# Patient Record
Sex: MALE | Race: BLACK OR AFRICAN AMERICAN | NOT HISPANIC OR LATINO | Employment: FULL TIME | URBAN - METROPOLITAN AREA
[De-identification: names, ages, dates, MRNs, and addresses within clinical notes are randomized per-mention and may not be internally consistent; named-entity substitution may affect disease eponyms.]

---

## 2017-02-22 ENCOUNTER — OFFICE VISIT (OUTPATIENT)
Dept: URGENT CARE | Facility: URGENT CARE | Age: 52
End: 2017-02-22
Payer: COMMERCIAL

## 2017-02-22 ENCOUNTER — RADIANT APPOINTMENT (OUTPATIENT)
Dept: GENERAL RADIOLOGY | Facility: CLINIC | Age: 52
End: 2017-02-22
Attending: PHYSICIAN ASSISTANT
Payer: COMMERCIAL

## 2017-02-22 VITALS
TEMPERATURE: 99 F | OXYGEN SATURATION: 97 % | BODY MASS INDEX: 28.67 KG/M2 | WEIGHT: 235.56 LBS | SYSTOLIC BLOOD PRESSURE: 128 MMHG | HEART RATE: 78 BPM | DIASTOLIC BLOOD PRESSURE: 69 MMHG

## 2017-02-22 DIAGNOSIS — R50.9 FEVER AND CHILLS: ICD-10-CM

## 2017-02-22 DIAGNOSIS — J18.9 PNEUMONIA OF LEFT LOWER LOBE DUE TO INFECTIOUS ORGANISM: Primary | ICD-10-CM

## 2017-02-22 LAB
FLUAV+FLUBV AG SPEC QL: NEGATIVE
FLUAV+FLUBV AG SPEC QL: NORMAL
SPECIMEN SOURCE: NORMAL

## 2017-02-22 PROCEDURE — 99214 OFFICE O/P EST MOD 30 MIN: CPT | Performed by: PHYSICIAN ASSISTANT

## 2017-02-22 PROCEDURE — 87804 INFLUENZA ASSAY W/OPTIC: CPT | Performed by: PHYSICIAN ASSISTANT

## 2017-02-22 PROCEDURE — 71020 XR CHEST 2 VW: CPT

## 2017-02-22 RX ORDER — CEFTRIAXONE 1 G/1
1000 INJECTION, POWDER, FOR SOLUTION INTRAMUSCULAR; INTRAVENOUS ONCE
Qty: 10 ML | Refills: 0 | OUTPATIENT
Start: 2017-02-22 | End: 2017-02-22

## 2017-02-22 RX ORDER — AZITHROMYCIN 250 MG/1
TABLET, FILM COATED ORAL
Qty: 6 TABLET | Refills: 0 | Status: SHIPPED | OUTPATIENT
Start: 2017-02-22 | End: 2017-07-12

## 2017-02-22 RX ORDER — CODEINE PHOSPHATE AND GUAIFENESIN 10; 100 MG/5ML; MG/5ML
1-2 SOLUTION ORAL EVERY 4 HOURS PRN
Qty: 240 ML | Refills: 0 | Status: SHIPPED | OUTPATIENT
Start: 2017-02-22 | End: 2017-07-12

## 2017-02-22 NOTE — PROGRESS NOTES
SUBJECTIVE:   Milad Brown is a 51 year old male presenting with a chief complaint of   1) fevers up to 105 over the past 2-3 days.  Last Advil was about 3 hours ago  2) productive cough for the past 2 days  3) sinus congestion for the past week  Onset of symptoms was as above.  Course of illness is worsening.    Severity moderate  Current and Associated symptoms: as above  Treatment measures tried include OTC meds.  Predisposing factors include h/o pneumonia a few years back.     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  Patient Active Problem List   Diagnosis     Herpes zoster     CARDIOVASCULAR SCREENING; LDL GOAL LESS THAN 160     Tinnitus, bilateral     Social History   Substance Use Topics     Smoking status: Never Smoker     Smokeless tobacco: Never Used     Alcohol use No       ROS:  CONSTITUTIONAL:as per HPI  INTEGUMENTARY/SKIN: NEGATIVE for worrisome rashes, moles or lesions  EYES: NEGATIVE for vision changes or irritation  ENT/MOUTH: as per HPI  RESP:as per HPI  CV: NEGATIVE for chest pain, palpitations or peripheral edema  GI: NEGATIVE for nausea, abdominal pain, heartburn, or change in bowel habits  MUSCULOSKELETAL: NEGATIVE for significant arthralgias or myalgia    OBJECTIVE  :/69 (BP Location: Left arm, Patient Position: Chair, Cuff Size: Adult Large)  Pulse 78  Temp 99  F (37.2  C) (Oral)  Wt 235 lb 9 oz (106.9 kg)  SpO2 97%  BMI 28.67 kg/m2  GENERAL APPEARANCE: healthy, alert and no distress  EYES: EOMI,  PERRL, conjunctiva clear  HENT: ear canals and TM's normal.  Nose and mouth without ulcers, erythema or lesions  NECK: supple, nontender, no lymphadenopathy  RESP: lungs clear to auscultation - no rales, rhonchi or wheezes  CV: regular rates and rhythm, normal S1 S2, no murmur noted  ABDOMEN:  soft, nontender, no HSM or masses and bowel sounds normal  NEURO: Normal strength and tone, sensory exam grossly normal,   normal speech and mentation  SKIN: no suspicious lesions or rashes    CXR: left lower lobe infiltrate     (J18.9) Pneumonia of left lower lobe due to infectious organism  (primary encounter diagnosis)  Comment:   Plan: cefTRIAXone (ROCEPHIN) 1 GM vial, azithromycin         (ZITHROMAX Z-CLOVIS) 250 MG tablet,         guaiFENesin-codeine (ROBITUSSIN AC) 100-10         MG/5ML SOLN solution            (R50.9) Fever and chills  Comment:   Plan: Influenza A/B antigen, XR Chest 2 Views        Tylenol or Ibuprofen as needed for pain or fevers.      Follow up with Primary Physician for re-check in one week, sooner should symptoms persist.  TO ED should symptoms worsen in ANY way.    Patient expresses understanding and agreement with the assessment and plan as above.

## 2017-02-22 NOTE — MR AVS SNAPSHOT
After Visit Summary   2/22/2017    Milad Brown    MRN: [id number]           Patient Information     Date Of Birth          1965        Visit Information        Provider Department      2/22/2017 2:15 PM Kathe Villar PA-C [facility]        Today's Diagnoses     Pneumonia of left lower lobe due to infectious organism    -  1    Fever and chills          Care Instructions    (J18.9) Pneumonia of left lower lobe due to infectious organism  (primary encounter diagnosis)  Comment:   Plan: cefTRIAXone (ROCEPHIN) 1 GM vial, azithromycin         (ZITHROMAX Z-CLOVIS) 250 MG tablet,         guaiFENesin-codeine (ROBITUSSIN AC) 100-10         MG/5ML SOLN solution            (R50.9) Fever and chills  Comment:   Plan: Influenza A/B antigen, XR Chest 2 Views        Tylenol or Ibuprofen as needed for pain or fevers.      Follow up with Primary Physician for re-check in one week, sooner should symptoms persist.  TO ED should symptoms worsen in ANY way.              Follow-ups after your visit        Who to contact     If you have questions or need follow up information about today's clinic visit or your schedule please contact [facility] directly at [phone].  Normal or non-critical lab and imaging results will be communicated to you by BrandConthart, letter or phone within 4 business days after the clinic has received the results. If you do not hear from us within 7 days, please contact the clinic through BrandConthart or phone. If you have a critical or abnormal lab result, we will notify you by phone as soon as possible.  Submit refill requests through Helishopter or call your pharmacy and they will forward the refill request to us. Please allow 3 business days for your refill to be completed.          Additional Information About Your Visit        BrandConthart Information     Helishopter lets you send messages to your doctor, view your test results, renew  "your prescriptions, schedule appointments and more. To sign up, go to [email or web address] . Click on \"Log in\" on the left side of the screen, which will take you to the Welcome page. Then click on \"Sign up Now\" on the right side of the page.     You will be asked to enter the access code listed below, as well as some personal information. Please follow the directions to create your username and password.     Your access code is: [email or web address]  Expires: 2017  4:07 PM     Your access code will  in 90 days. If you need help or a new code, please call your [place] clinic or [phone].        Care EveryWhere ID     This is your Care EveryWhere ID. This could be used by other organizations to access your [place] medical records  [id number]        Your Vitals Were     Pulse Temperature Pulse Oximetry BMI (Body Mass Index)          78 99  F (37.2  C) (Oral) 97% 28.67 kg/m2         Blood Pressure from Last 3 Encounters:   17 128/69   10/14/16 126/80   08/10/16 126/74    Weight from Last 3 Encounters:   17 235 lb 9 oz (106.9 kg)   10/14/16 239 lb (108.4 kg)   08/10/16 241 lb (109.3 kg)              We Performed the Following     Influenza A/B antigen          Today's Medication Changes          These changes are accurate as of: 17  4:07 PM.  If you have any questions, ask your nurse or doctor.               Start taking these medicines.        Dose/Directions    azithromycin 250 MG tablet   Commonly known as:  ZITHROMAX Z-CLOVIS   Used for:  Pneumonia of left lower lobe due to infectious organism   Started by:  Kathe Villar PA-C        two tablets first day and 1 tablet daily for 4 days   Quantity:  6 tablet   Refills:  0       cefTRIAXone 1 GM vial   Commonly known as:  ROCEPHIN   Used for:  Pneumonia of left lower lobe due to infectious organism   Started by:  Kathe Villar PA-C        Dose:  1000 mg   Inject 1 g (1,000 mg) into the muscle once for 1 dose "   Quantity:  10 mL   Refills:  0       guaiFENesin-codeine 100-10 MG/5ML Soln solution   Commonly known as:  ROBITUSSIN AC   Used for:  Pneumonia of left lower lobe due to infectious organism   Started by:  Kathe Villar PA-C        Dose:  1-2 tsp.   Take 5-10 mLs by mouth every 4 hours as needed for cough   Quantity:  240 mL   Refills:  0            Where to get your medicines      These medications were sent to [facility] [address] [place] [address] [id number] [place] [address]  [id number] [place] [address]    Hours:  24-hours Phone:  [phone]     azithromycin 250 MG tablet         Some of these will need a paper prescription and others can be bought over the counter.  Ask your nurse if you have questions.     Bring a paper prescription for each of these medications     guaiFENesin-codeine 100-10 MG/5ML Soln solution       You don't need a prescription for these medications     cefTRIAXone 1 GM vial                Primary Care Provider Office Phone # Fax #    Saeid Mcleod [phone]       [facility] [address] [place] [address]        Thank you!     Thank you for choosing [facility]  for your care. Our goal is always to provide you with excellent care. Hearing back from our patients is one way we can continue to improve our services. Please take a few minutes to complete the written survey that you may receive in the mail after your visit with us. Thank you!             Your Updated Medication List - Protect others around you: Learn how to safely use, store and throw away your medicines at www.disposemymeds.org.          This list is accurate as of: 2/22/17  4:07 PM.  Always use your most recent med list.                   Brand Name Dispense Instructions for use    azithromycin 250 MG tablet    ZITHROMAX Z-CLOVIS    6 tablet    two tablets first day and 1 tablet daily for 4  days       cefTRIAXone 1 GM vial    ROCEPHIN    10 mL    Inject 1 g (1,000 mg) into the muscle once for 1 dose       guaiFENesin-codeine 100-10 MG/5ML Soln solution    ROBITUSSIN AC    240 mL    Take 5-10 mLs by mouth every 4 hours as needed for cough       ibuprofen 800 MG tablet    ADVIL/MOTRIN    30 tablet    Take 1 tablet (800 mg) by mouth every 8 hours as needed for moderate pain

## 2017-02-22 NOTE — NURSING NOTE
"Chief Complaint   Patient presents with     Cough     cough, chest pain when coughing, wheezing, sob, bodyaches, fatigue and fever for one week.        Initial /69 (BP Location: Left arm, Patient Position: Chair, Cuff Size: Adult Large)  Pulse 78  Temp 99  F (37.2  C) (Oral)  Wt 235 lb 9 oz (106.9 kg)  SpO2 97%  BMI 28.67 kg/m2 Estimated body mass index is 28.67 kg/(m^2) as calculated from the following:    Height as of 10/14/16: 6' 4\" (1.93 m).    Weight as of this encounter: 235 lb 9 oz (106.9 kg).  Medication Reconciliation: complete    "

## 2017-02-22 NOTE — PATIENT INSTRUCTIONS
(J18.9) Pneumonia of left lower lobe due to infectious organism  (primary encounter diagnosis)  Comment:   Plan: cefTRIAXone (ROCEPHIN) 1 GM vial, azithromycin         (ZITHROMAX Z-CLOVIS) 250 MG tablet,         guaiFENesin-codeine (ROBITUSSIN AC) 100-10         MG/5ML SOLN solution            (R50.9) Fever and chills  Comment:   Plan: Influenza A/B antigen, XR Chest 2 Views        Tylenol or Ibuprofen as needed for pain or fevers.      Follow up with Primary Physician for re-check in one week, sooner should symptoms persist.  TO ED should symptoms worsen in ANY way.

## 2017-02-22 NOTE — LETTER
[facility]  [address] [address]  [phone]      February 22, 2017    RE:  Milad Brown                                                                                                                                                       [address] [facility] [address]            To whom it may concern:    Milad Brown was seen in clinic today and was diagnosed with Pneumonia.  He may return to work when he has been fever free for 24 hours.          Sincerely,        Kathe Brennan PAC    [facility]

## 2017-04-12 ENCOUNTER — OFFICE VISIT (OUTPATIENT)
Dept: INTERNAL MEDICINE | Facility: CLINIC | Age: 52
End: 2017-04-12
Payer: COMMERCIAL

## 2017-04-12 VITALS
HEIGHT: 76 IN | TEMPERATURE: 98.4 F | DIASTOLIC BLOOD PRESSURE: 80 MMHG | HEART RATE: 80 BPM | SYSTOLIC BLOOD PRESSURE: 110 MMHG | OXYGEN SATURATION: 97 % | BODY MASS INDEX: 28.56 KG/M2 | WEIGHT: 234.5 LBS

## 2017-04-12 DIAGNOSIS — J40 BRONCHITIS: Primary | ICD-10-CM

## 2017-04-12 PROCEDURE — 99213 OFFICE O/P EST LOW 20 MIN: CPT | Performed by: FAMILY MEDICINE

## 2017-04-12 RX ORDER — CEFUROXIME AXETIL 500 MG/1
500 TABLET ORAL 2 TIMES DAILY
Qty: 20 TABLET | Refills: 0 | Status: SHIPPED | OUTPATIENT
Start: 2017-04-12 | End: 2017-07-12

## 2017-04-12 RX ORDER — PREDNISONE 20 MG/1
TABLET ORAL
Qty: 12 TABLET | Refills: 0 | Status: SHIPPED | OUTPATIENT
Start: 2017-04-12 | End: 2017-07-12

## 2017-04-12 NOTE — MR AVS SNAPSHOT
"              After Visit Summary   4/12/2017    Milad Brown    MRN: [id number]           Patient Information     Date Of Birth          1965        Visit Information        Provider Department      4/12/2017 3:00 PM Anjum Weaver MD [facility]        Today's Diagnoses     Bronchitis    -  1      Care Instructions      Take prescribed medication as directed.          Follow-ups after your visit        Your next 10 appointments already scheduled     May 10, 2017  4:00 PM CDT   PHYSICAL with Kay Garnica MD   [facility]    [address]  [facility] [id number] [phone]              Who to contact     If you have questions or need follow up information about today's clinic visit or your schedule please contact [facility] directly at [phone].  Normal or non-critical lab and imaging results will be communicated to you by MyChart, letter or phone within 4 business days after the clinic has received the results. If you do not hear from us within 7 days, please contact the clinic through MyChart or phone. If you have a critical or abnormal lab result, we will notify you by phone as soon as possible.  Submit refill requests through FarmaciaClub or call your pharmacy and they will forward the refill request to us. Please allow 3 business days for your refill to be completed.          Additional Information About Your Visit        MyChart Information     FarmaciaClub lets you send messages to your doctor, view your test results, renew your prescriptions, schedule appointments and more. To sign up, go to [email or web address] . Click on \"Log in\" on the left side of the screen, which will take you to the Welcome page. Then click on \"Sign up Now\" on the right side of the page.     You will be asked to enter the access code listed below, as well as some personal information. Please follow the directions to create " "your username and password.     Your access code is: [email or web address]  Expires: 2017  5:07 PM     Your access code will  in 90 days. If you need help or a new code, please call your [facility] or [phone].        Care EveryWhere ID     This is your Care EveryWhere ID. This could be used by other organizations to access your [place] medical records  [id number]        Your Vitals Were     Pulse Temperature Height Pulse Oximetry BMI (Body Mass Index)       80 98.4  F (36.9  C) (Oral) 6' 4\" (1.93 m) 97% 28.54 kg/m2        Blood Pressure from Last 3 Encounters:   17 110/80   17 128/69   10/14/16 126/80    Weight from Last 3 Encounters:   17 234 lb 8 oz (106.4 kg)   17 235 lb 9 oz (106.9 kg)   10/14/16 239 lb (108.4 kg)              Today, you had the following     No orders found for display         Today's Medication Changes          These changes are accurate as of: 17  3:28 PM.  If you have any questions, ask your nurse or doctor.               Start taking these medicines.        Dose/Directions    cefuroxime 500 MG tablet   Commonly known as:  CEFTIN   Used for:  Bronchitis   Started by:  Anjum Weaver MD        Dose:  500 mg   Take 1 tablet (500 mg) by mouth 2 times daily   Quantity:  20 tablet   Refills:  0       predniSONE 20 MG tablet   Commonly known as:  DELTASONE   Used for:  Bronchitis   Started by:  Anjum Weaver MD        Take 2 daily for 4 days, then 1 daily for 4 days.   Quantity:  12 tablet   Refills:  0            Where to get your medicines      These medications were sent to [facility] [address] [place] [address]  [address] [place] [address]    Hours:  24-hours Phone:  [phone]     cefuroxime 500 MG tablet    predniSONE 20 MG tablet                Primary Care Provider Office Phone # Fax #    Saeid Mcleod [phone]       XXX RESIGNED  [address] [facility] [address]  [facility] [address]        Thank you!     Thank you for choosing [facility]  for your care. Our goal is always to provide you with excellent care. Hearing back from our patients is one way we can continue to improve our services. Please take a few minutes to complete the written survey that you may receive in the mail after your visit with us. Thank you!             Your Updated Medication List - Protect others around you: Learn how to safely use, store and throw away your medicines at www.disposemymeds.org.          This list is accurate as of: 4/12/17  3:28 PM.  Always use your most recent med list.                   Brand Name Dispense Instructions for use    azithromycin 250 MG tablet    ZITHROMAX Z-CLOVIS    6 tablet    two tablets first day and 1 tablet daily for 4 days       cefuroxime 500 MG tablet    CEFTIN    20 tablet    Take 1 tablet (500 mg) by mouth 2 times daily       guaiFENesin-codeine 100-10 MG/5ML Soln solution    ROBITUSSIN AC    240 mL    Take 5-10 mLs by mouth every 4 hours as needed for cough       ibuprofen 800 MG tablet    ADVIL/MOTRIN    30 tablet    Take 1 tablet (800 mg) by mouth every 8 hours as needed for moderate pain       predniSONE 20 MG tablet    DELTASONE    12 tablet    Take 2 daily for 4 days, then 1 daily for 4 days.

## 2017-04-12 NOTE — PROGRESS NOTES
"CHIEF COMPLAINT    Chest congestion      HISTORY    He has persistent chest congestion and cough. Began in Feb with flu like illness. He did take Z Christ.    No asthma    Non smoker    He has been working some long hours + refs sports.    Patient Active Problem List   Diagnosis     Herpes zoster     CARDIOVASCULAR SCREENING; LDL GOAL LESS THAN 160     Tinnitus, bilateral       REVIEW OF SYSTEMS    No ST  No HA  No SOB or wheeze      History reviewed. No pertinent past medical history.      EXAM  /80 (BP Location: Right arm, Patient Position: Chair, Cuff Size: Adult Large)  Pulse 80  Temp 98.4  F (36.9  C) (Oral)  Ht 6' 4\" (1.93 m)  Wt 234 lb 8 oz (106.4 kg)  SpO2 97%  BMI 28.54 kg/m2    Appears well in general  Phar neg  Neck no adenopathy or mass  Chest clear      (J40) Bronchitis  (primary encounter diagnosis)  Comment:   Plan: cefuroxime (CEFTIN) 500 MG tablet, predniSONE         (DELTASONE) 20 MG tablet              "

## 2017-04-12 NOTE — NURSING NOTE
"Chief Complaint   Patient presents with     Cough     He is coughing - spitting up mucus - for about 2 weeks.       Initial /80 (BP Location: Right arm, Patient Position: Chair, Cuff Size: Adult Large)  Pulse 80  Temp 98.4  F (36.9  C) (Oral)  Ht 6' 4\" (1.93 m)  Wt 234 lb 8 oz (106.4 kg)  SpO2 97%  BMI 28.54 kg/m2 Estimated body mass index is 28.54 kg/(m^2) as calculated from the following:    Height as of this encounter: 6' 4\" (1.93 m).    Weight as of this encounter: 234 lb 8 oz (106.4 kg).  Medication Reconciliation: complete   Sharmin Elder MA      "

## 2017-07-12 ENCOUNTER — OFFICE VISIT (OUTPATIENT)
Dept: INTERNAL MEDICINE | Facility: CLINIC | Age: 52
End: 2017-07-12
Payer: COMMERCIAL

## 2017-07-12 VITALS
HEIGHT: 76 IN | HEART RATE: 67 BPM | DIASTOLIC BLOOD PRESSURE: 70 MMHG | SYSTOLIC BLOOD PRESSURE: 118 MMHG | WEIGHT: 238.9 LBS | TEMPERATURE: 98.2 F | OXYGEN SATURATION: 97 % | BODY MASS INDEX: 29.09 KG/M2

## 2017-07-12 DIAGNOSIS — Z00.00 ENCOUNTER FOR ROUTINE ADULT HEALTH EXAMINATION WITHOUT ABNORMAL FINDINGS: Primary | ICD-10-CM

## 2017-07-12 LAB
BASOPHILS # BLD AUTO: 0 10E9/L (ref 0–0.2)
BASOPHILS NFR BLD AUTO: 1 %
DIFFERENTIAL METHOD BLD: ABNORMAL
EOSINOPHIL # BLD AUTO: 0.1 10E9/L (ref 0–0.7)
EOSINOPHIL NFR BLD AUTO: 2 %
ERYTHROCYTE [DISTWIDTH] IN BLOOD BY AUTOMATED COUNT: 13.7 % (ref 10–15)
HCT VFR BLD AUTO: 40.7 % (ref 40–53)
HGB BLD-MCNC: 13.2 G/DL (ref 13.3–17.7)
LYMPHOCYTES # BLD AUTO: 2.4 10E9/L (ref 0.8–5.3)
LYMPHOCYTES NFR BLD AUTO: 52 %
MCH RBC QN AUTO: 27.9 PG (ref 26.5–33)
MCHC RBC AUTO-ENTMCNC: 32.4 G/DL (ref 31.5–36.5)
MCV RBC AUTO: 86 FL (ref 78–100)
MONOCYTES # BLD AUTO: 0.4 10E9/L (ref 0–1.3)
MONOCYTES NFR BLD AUTO: 8 %
NEUTROPHILS # BLD AUTO: 1.7 10E9/L (ref 1.6–8.3)
NEUTROPHILS NFR BLD AUTO: 37 %
PLATELET # BLD AUTO: 172 10E9/L (ref 150–450)
PLATELET # BLD EST: NORMAL 10*3/UL
RBC # BLD AUTO: 4.73 10E12/L (ref 4.4–5.9)
RBC MORPH BLD: ABNORMAL
WBC # BLD AUTO: 4.6 10E9/L (ref 4–11)

## 2017-07-12 PROCEDURE — 99396 PREV VISIT EST AGE 40-64: CPT | Performed by: INTERNAL MEDICINE

## 2017-07-12 PROCEDURE — 80061 LIPID PANEL: CPT | Performed by: INTERNAL MEDICINE

## 2017-07-12 PROCEDURE — 36415 COLL VENOUS BLD VENIPUNCTURE: CPT | Performed by: INTERNAL MEDICINE

## 2017-07-12 PROCEDURE — G0103 PSA SCREENING: HCPCS | Performed by: INTERNAL MEDICINE

## 2017-07-12 PROCEDURE — 80050 GENERAL HEALTH PANEL: CPT | Performed by: INTERNAL MEDICINE

## 2017-07-12 NOTE — MR AVS SNAPSHOT
After Visit Summary   7/12/2017    Milad Brown    MRN: [id number]           Patient Information     Date Of Birth          1965        Visit Information        Provider Department      7/12/2017 3:00 PM Kay Garnica MD [facility]        Today's Diagnoses     Encounter for routine adult health examination without abnormal findings    -  1      Care Instructions      Preventive Health Recommendations  Male Ages 50 - 64    Yearly exam:             See your health care provider every year in order to  o   Review health changes.   o   Discuss preventive care.    o   Review your medicines if your doctor has prescribed any.     Have a cholesterol test every 5 years, or more frequently if you are at risk for high cholesterol/heart disease.     Have a diabetes test (fasting glucose) every three years. If you are at risk for diabetes, you should have this test more often.     Have a colonoscopy at age 50, or have a yearly FIT test (stool test). These exams will check for colon cancer.      Talk with your health care provider about whether or not a prostate cancer screening test (PSA) is right for you.    You should be tested each year for STDs (sexually transmitted diseases), if you re at risk.     Shots: Get a flu shot each year. Get a tetanus shot every 10 years.     Nutrition:    Eat at least 5 servings of fruits and vegetables daily.     Eat whole-grain bread, whole-wheat pasta and brown rice instead of white grains and rice.     Talk to your provider about Calcium and Vitamin D.     Lifestyle    Exercise for at least 150 minutes a week (30 minutes a day, 5 days a week). This will help you control your weight and prevent disease.     Limit alcohol to one drink per day.     No smoking.     Wear sunscreen to prevent skin cancer.     See your dentist every six months for an exam and cleaning.     See your eye doctor every 1 to 2 years.            Follow-ups after your visit    "     Who to contact     If you have questions or need follow up information about today's clinic visit or your schedule please contact [facility] directly at [phone].  Normal or non-critical lab and imaging results will be communicated to you by MyChart, letter or phone within 4 business days after the clinic has received the results. If you do not hear from us within 7 days, please contact the clinic through MyChart or phone. If you have a critical or abnormal lab result, we will notify you by phone as soon as possible.  Submit refill requests through Agentrun or call your pharmacy and they will forward the refill request to us. Please allow 3 business days for your refill to be completed.          Additional Information About Your Visit        Agentrun Information     Agentrun lets you send messages to your doctor, view your test results, renew your prescriptions, schedule appointments and more. To sign up, go to [email or web address] . Click on \"Log in\" on the left side of the screen, which will take you to the Welcome page. Then click on \"Sign up Now\" on the right side of the page.     You will be asked to enter the access code listed below, as well as some personal information. Please follow the directions to create your username and password.     Your access code is: [email or web address]  Expires: 10/10/2017  4:05 PM     Your access code will  in 90 days. If you need help or a new code, please call your [place] clinic or [phone].        Care EveryWhere ID     This is your Care EveryWhere ID. This could be used by other organizations to access your [place] medical records  [id number]        Your Vitals Were     Pulse Temperature Height Pulse Oximetry BMI (Body Mass Index)       67 98.2  F (36.8  C) (Oral) 6' 4\" (1.93 m) 97% 29.08 kg/m2        Blood Pressure from Last 3 Encounters:   17 118/70   17 110/80   17 128/69    Weight from Last 3 Encounters:   17 238 " lb 14.4 oz (108.4 kg)   04/12/17 234 lb 8 oz (106.4 kg)   02/22/17 235 lb 9 oz (106.9 kg)              We Performed the Following     CBC with platelets differential     Comprehensive metabolic panel     Lipid panel reflex to direct LDL     PSA, screen     TSH with free T4 reflex          Today's Medication Changes          These changes are accurate as of: 7/12/17  4:05 PM.  If you have any questions, ask your nurse or doctor.               Stop taking these medicines if you haven't already. Please contact your care team if you have questions.     cefuroxime 500 MG tablet   Commonly known as:  CEFTIN   Stopped by:  Kay Garnica MD           guaiFENesin-codeine 100-10 MG/5ML Soln solution   Commonly known as:  ROBITUSSIN AC   Stopped by:  Kay Garnica MD           predniSONE 20 MG tablet   Commonly known as:  DELTASONE   Stopped by:  Kay Garnica MD                    Primary Care Provider Office Phone # Fax #    Saeid Mcleod [phone]       XXX RESIGNED  [address] [address]        Equal Access to Services     [facility]: Hadii aad ku hadasho Soomaali, waaxda luqadaha, qaybta kaalmada adeegyada, gregorio cruz haychetan adealbert ma . So [facility] [phone].    ATENCIÓN: Si habla español, tiene a bautista disposición servicios gratuitos de asistencia lingüística. [facility] [phone].    We comply with applicable federal civil rights laws and [place] laws. We do not discriminate on the basis of race, color, national origin, age, disability sex, sexual orientation or gender identity.            Thank you!     Thank you for choosing [facility]  for your care. Our goal is always to provide you with excellent care. Hearing back from our patients is one way we can continue to improve our services. Please take a few minutes to complete the written survey that you may receive in the mail after your visit with us. Thank you!              Your Updated Medication List - Protect others around you: Learn how to safely use, store and throw away your medicines at www.disposemymeds.org.          This list is accurate as of: 7/12/17  4:05 PM.  Always use your most recent med list.                   Brand Name Dispense Instructions for use Diagnosis    ibuprofen 800 MG tablet    ADVIL/MOTRIN    30 tablet    Take 1 tablet (800 mg) by mouth every 8 hours as needed for moderate pain    Throat pain, Furuncle of skin or subcutaneous tissue

## 2017-07-12 NOTE — PROGRESS NOTES
SUBJECTIVE:   CC: Milad Brown is an 51 year old male who presents for preventative health visit.     Healthy Habits:    Do you get at least three servings of calcium containing foods daily (dairy, green leafy vegetables, etc.)? yes    Amount of exercise or daily activities, outside of work: 7 day(s) per week- usually is a ref    Problems taking medications regularly not applicable    Medication side effects: No    Have you had an eye exam in the past two years? yes    Do you see a dentist twice per year? Yes    Do you have sleep apnea, excessive snoring or daytime drowsiness?no          Today's PHQ-2 Score:   PHQ-2 ( 1999 Pfizer) 7/12/2017 4/12/2017   Q1: Little interest or pleasure in doing things 0 0   Q2: Feeling down, depressed or hopeless 0 0   PHQ-2 Score 0 0       Abuse: Current or Past(Physical, Sexual or Emotional)- No  Do you feel safe in your environment - Yes    Social History   Substance Use Topics     Smoking status: Never Smoker     Smokeless tobacco: Never Used     Alcohol use No     no    Last PSA:   PSA   Date Value Ref Range Status   12/29/2015 1.22 0 - 4 ug/L Final       Reviewed orders with patient. Reviewed health maintenance and updated orders accordingly - Yes  Labs reviewed in EPIC    Reviewed and updated as needed this visit by clinical staff  Tobacco  Allergies  Meds  Med Hx  Surg Hx  Fam Hx  Soc Hx        Reviewed and updated as needed this visit by Provider          ROS:  C: NEGATIVE for fever, chills, change in weight  I: NEGATIVE for worrisome rashes, moles or lesions  E: NEGATIVE for vision changes or irritation  ENT: NEGATIVE for ear, mouth and throat problems  R: NEGATIVE for significant cough or SOB  CV: NEGATIVE for chest pain, palpitations or peripheral edema  GI: NEGATIVE for nausea, abdominal pain, heartburn, or change in bowel habits   male: negative for dysuria, hematuria, decreased urinary stream, erectile dysfunction, urethral discharge  M: NEGATIVE for  "significant arthralgias or myalgia  N: NEGATIVE for weakness, dizziness or paresthesias  P: NEGATIVE for changes in mood or affect    OBJECTIVE:   /70 (BP Location: Left arm, Patient Position: Chair, Cuff Size: Adult Large)  Pulse 67  Temp 98.2  F (36.8  C) (Oral)  Ht 6' 4\" (1.93 m)  Wt 238 lb 14.4 oz (108.4 kg)  SpO2 97%  BMI 29.08 kg/m2   /70 (BP Location: Left arm, Patient Position: Chair, Cuff Size: Adult Large)  Pulse 67  Temp 98.2  F (36.8  C) (Oral)  Ht 6' 4\" (1.93 m)  Wt 238 lb 14.4 oz (108.4 kg)  SpO2 97%  BMI 29.08 kg/m2    EXAM:  GENERAL: healthy, alert and no distress  EYES: Eyes grossly normal to inspection, PERRL and conjunctivae and sclerae normal  HENT: ear canals and TM's normal, nose and mouth without ulcers or lesions  NECK: no adenopathy, no asymmetry, masses, or scars and thyroid normal to palpation  RESP: lungs clear to auscultation - no rales, rhonchi or wheezes  CV: regular rate and rhythm, normal S1 S2, no S3 or S4, no murmur, click or rub, no peripheral edema and peripheral pulses strong  ABDOMEN: soft, nontender, no hepatosplenomegaly, no masses and bowel sounds normal  : no nodules or tenderness appreciated of the prostate  MS: no gross musculoskeletal defects noted, no edema  SKIN: no suspicious lesions or rashes  NEURO: Normal strength and tone, mentation intact and speech normal  PSYCH: mentation appears normal, affect normal/bright    ASSESSMENT/PLAN:       ICD-10-CM    1. Encounter for routine adult health examination without abnormal findings Z00.00 PSA, screen     Comprehensive metabolic panel     CBC with platelets differential     TSH with free T4 reflex     Lipid panel reflex to direct LDL       COUNSELING:  Reviewed preventive health counseling, as reflected in patient instructions       reports that he has never smoked. He has never used smokeless tobacco.    Estimated body mass index is 29.08 kg/(m^2) as calculated from the following:    Height as of " "this encounter: 6' 4\" (1.93 m).    Weight as of this encounter: 238 lb 14.4 oz (108.4 kg).   Weight management plan: diet and exercise    Counseling Resources:  ATP IV Guidelines  Pooled Cohorts Equation Calculator  FRAX Risk Assessment  ICSI Preventive Guidelines  Dietary Guidelines for Americans, 2010  USDA's MyPlate  ASA Prophylaxis  Lung CA Screening    Kay Garnica MD  [facility]  "

## 2017-07-12 NOTE — NURSING NOTE
"Chief Complaint   Patient presents with     Physical       Initial /70 (BP Location: Left arm, Patient Position: Chair, Cuff Size: Adult Large)  Pulse 67  Temp 98.2  F (36.8  C) (Oral)  Ht 6' 4\" (1.93 m)  Wt 238 lb 14.4 oz (108.4 kg)  SpO2 97%  BMI 29.08 kg/m2 Estimated body mass index is 29.08 kg/(m^2) as calculated from the following:    Height as of this encounter: 6' 4\" (1.93 m).    Weight as of this encounter: 238 lb 14.4 oz (108.4 kg).  Medication Reconciliation: complete   Facundo Guzman MA    "

## 2017-07-13 LAB
ALBUMIN SERPL-MCNC: 3.9 G/DL (ref 3.4–5)
ALP SERPL-CCNC: 77 U/L (ref 40–150)
ALT SERPL W P-5'-P-CCNC: 75 U/L (ref 0–70)
ANION GAP SERPL CALCULATED.3IONS-SCNC: 6 MMOL/L (ref 3–14)
AST SERPL W P-5'-P-CCNC: 29 U/L (ref 0–45)
BILIRUB SERPL-MCNC: 0.3 MG/DL (ref 0.2–1.3)
BUN SERPL-MCNC: 11 MG/DL (ref 7–30)
CALCIUM SERPL-MCNC: 8.8 MG/DL (ref 8.5–10.1)
CHLORIDE SERPL-SCNC: 111 MMOL/L (ref 94–109)
CHOLEST SERPL-MCNC: 183 MG/DL
CO2 SERPL-SCNC: 28 MMOL/L (ref 20–32)
CREAT SERPL-MCNC: 1 MG/DL (ref 0.66–1.25)
GFR SERPL CREATININE-BSD FRML MDRD: 78 ML/MIN/1.7M2
GLUCOSE SERPL-MCNC: 82 MG/DL (ref 70–99)
HDLC SERPL-MCNC: 74 MG/DL
LDLC SERPL CALC-MCNC: 85 MG/DL
NONHDLC SERPL-MCNC: 109 MG/DL
POTASSIUM SERPL-SCNC: 4.3 MMOL/L (ref 3.4–5.3)
PROT SERPL-MCNC: 7.1 G/DL (ref 6.8–8.8)
PSA SERPL-ACNC: 0.92 UG/L (ref 0–4)
SODIUM SERPL-SCNC: 145 MMOL/L (ref 133–144)
TRIGL SERPL-MCNC: 120 MG/DL
TSH SERPL DL<=0.005 MIU/L-ACNC: 1.41 MU/L (ref 0.4–4)

## 2017-07-18 ENCOUNTER — TELEPHONE (OUTPATIENT)
Dept: INTERNAL MEDICINE | Facility: CLINIC | Age: 52
End: 2017-07-18

## 2018-02-20 ENCOUNTER — OFFICE VISIT (OUTPATIENT)
Dept: INTERNAL MEDICINE | Facility: CLINIC | Age: 53
End: 2018-02-20
Payer: COMMERCIAL

## 2018-02-20 VITALS
HEIGHT: 77 IN | BODY MASS INDEX: 28.34 KG/M2 | TEMPERATURE: 98.7 F | SYSTOLIC BLOOD PRESSURE: 124 MMHG | OXYGEN SATURATION: 97 % | HEART RATE: 87 BPM | DIASTOLIC BLOOD PRESSURE: 72 MMHG | WEIGHT: 240 LBS

## 2018-02-20 DIAGNOSIS — R05.9 COUGH: Primary | ICD-10-CM

## 2018-02-20 DIAGNOSIS — R19.7 DIARRHEA, UNSPECIFIED TYPE: ICD-10-CM

## 2018-02-20 LAB
FLUAV+FLUBV AG SPEC QL: NEGATIVE
FLUAV+FLUBV AG SPEC QL: NEGATIVE
SPECIMEN SOURCE: NORMAL

## 2018-02-20 PROCEDURE — 99213 OFFICE O/P EST LOW 20 MIN: CPT | Performed by: INTERNAL MEDICINE

## 2018-02-20 PROCEDURE — 87804 INFLUENZA ASSAY W/OPTIC: CPT | Performed by: INTERNAL MEDICINE

## 2018-02-20 NOTE — MR AVS SNAPSHOT
"              After Visit Summary   2018    Milad Brown    MRN: [id number]           Patient Information     Date Of Birth          1965        Visit Information        Provider Department      2018 10:40 AM Kay Garnica MD [facility]        Today's Diagnoses     Cough    -  1    Diarrhea, unspecified type           Follow-ups after your visit        Who to contact     If you have questions or need follow up information about today's clinic visit or your schedule please contact [facility] directly at [phone].  Normal or non-critical lab and imaging results will be communicated to you by MyChart, letter or phone within 4 business days after the clinic has received the results. If you do not hear from us within 7 days, please contact the clinic through [email or web address] or phone. If you have a critical or abnormal lab result, we will notify you by phone as soon as possible.  Submit refill requests through Privaris or call your pharmacy and they will forward the refill request to us. Please allow 3 business days for your refill to be completed.          Additional Information About Your Visit        MyChart Information     Privaris lets you send messages to your doctor, view your test results, renew your prescriptions, schedule appointments and more. To sign up, go to [email or web address] . Click on \"Log in\" on the left side of the screen, which will take you to the Welcome page. Then click on \"Sign up Now\" on the right side of the page.     You will be asked to enter the access code listed below, as well as some personal information. Please follow the directions to create your username and password.     Your access code is: [email or web address]  Expires: 2018  1:52 PM     Your access code will  in 90 days. If you need help or a new code, please call your [facility] or [phone].        Care EveryWhere ID     This is your Care EveryWhere ID. This " "could be used by other organizations to access your [place] medical records  [id number]        Your Vitals Were     Pulse Temperature Height Pulse Oximetry BMI (Body Mass Index)       87 98.7  F (37.1  C) (Oral) 6' 5\" (1.956 m) 97% 28.46 kg/m2        Blood Pressure from Last 3 Encounters:   02/20/18 124/72   07/12/17 118/70   04/12/17 110/80    Weight from Last 3 Encounters:   02/20/18 240 lb (108.9 kg)   07/12/17 238 lb 14.4 oz (108.4 kg)   04/12/17 234 lb 8 oz (106.4 kg)              We Performed the Following     Influenza A/B antigen        Primary Care Provider Fax #    Physician No Ref-Primary [phone]       No address on file        Equal Access to Services     ALTA GUSTAFSON : Hollie Ramirez, walacy young, kenya kaalmamatt villeda, gregorio ma . So [facility] [phone].    ATENCIÓN: Si habla español, tiene a bautista disposición servicios gratuitos de asistencia lingüística. Peterson al [phone].    We comply with applicable federal civil rights laws and [place] laws. We do not discriminate on the basis of race, color, national origin, age, disability, sex, sexual orientation, or gender identity.            Thank you!     Thank you for choosing [facility]  for your care. Our goal is always to provide you with excellent care. Hearing back from our patients is one way we can continue to improve our services. Please take a few minutes to complete the written survey that you may receive in the mail after your visit with us. Thank you!             Your Updated Medication List - Protect others around you: Learn how to safely use, store and throw away your medicines at www.disposemymeds.org.      Notice  As of 2/20/2018  1:52 PM    You have not been prescribed any medications.      "

## 2018-02-20 NOTE — PROGRESS NOTES
"  SUBJECTIVE:   Milad Brown is a 52 year old male who presents to clinic today for the following health issues:      Diarrhea and URI.  The patient had a dry cough and diarrhea yesterday.  Today his symptoms have improved.  He denies any abdominal pain or blood in the stool.  Denies nausea or vomiting.  No atypical foods or recent travel or recent hospital stays or antibiotics.  No one sick around him.  His cough has no associated shortness of breath.  Chest hurts with coughing.  No fevers or chills.      Problem list and histories reviewed & adjusted, as indicated.      Reviewed and updated as needed this visit by clinical staff  Tobacco  Allergies  Meds  Med Hx  Surg Hx  Fam Hx  Soc Hx      Reviewed and updated as needed this visit by Provider         ROS:  CONSTITUTIONAL: NEG fevers and chills  RESP: POS SOB and cough  CV: NEGATIVE for chest pain, palpitations or peripheral edema  GI: POS diarrhea  Neuro: numbness/tingling    OBJECTIVE:     /72 (BP Location: Left arm, Cuff Size: Adult Large)  Pulse 87  Temp 98.7  F (37.1  C) (Oral)  Ht 6' 5\" (1.956 m)  Wt 240 lb (108.9 kg)  SpO2 97%  BMI 28.46 kg/m2  Body mass index is 28.46 kg/(m^2).  GENERAL: healthy, alert and no distress  ENT: ENT within normal limits  NECK: no adenopathy, no asymmetry, masses, or scars and thyroid normal to palpation  RESP: lungs clear to auscultation - no rales, rhonchi or wheezes  CV: regular rate and rhythm, normal S1 S2, no S3 or S4, no murmur, click or rub, no peripheral edema and peripheral pulses strong  GI: no abdominal tenderness upon palpation  Extr: sensation intact upon monofilament    ASSESSMENT/PLAN:     (R05) Cough  (primary encounter diagnosis)  Comment: assess for influenza  Plan: Influenza A/B antigen            (R19.7) Diarrhea, unspecified type  Comment: resolved  Plan: monitor  -pt to call if symptoms return      Kay Garnica MD  [facility]    "

## 2018-02-20 NOTE — NURSING NOTE
"Chief Complaint   Patient presents with     URI     yesterday started coughing-chest hurts, feverish, diarrhea yesterday, body aches       Initial /72 (BP Location: Left arm, Cuff Size: Adult Large)  Pulse 87  Temp 98.7  F (37.1  C) (Oral)  Ht 6' 5\" (1.956 m)  Wt 240 lb (108.9 kg)  SpO2 97%  BMI 28.46 kg/m2 Estimated body mass index is 28.46 kg/(m^2) as calculated from the following:    Height as of this encounter: 6' 5\" (1.956 m).    Weight as of this encounter: 240 lb (108.9 kg).  Medication Reconciliation: complete   Rhona Medrano CMA      "

## 2018-02-20 NOTE — LETTER
To whom it may concern:    Milad Brown is a patient under my care.  He was on 2/20/18 for a medical condition.  Please excuse him from work on 2/19/18 until 2/22/18.      Please call with any questions.         Kay Garnica MD

## 2018-07-25 ENCOUNTER — OFFICE VISIT (OUTPATIENT)
Dept: INTERNAL MEDICINE | Facility: CLINIC | Age: 53
End: 2018-07-25
Payer: COMMERCIAL

## 2018-07-25 VITALS
BODY MASS INDEX: 27.87 KG/M2 | OXYGEN SATURATION: 98 % | SYSTOLIC BLOOD PRESSURE: 130 MMHG | TEMPERATURE: 98.9 F | RESPIRATION RATE: 12 BRPM | HEART RATE: 64 BPM | HEIGHT: 77 IN | WEIGHT: 236 LBS | DIASTOLIC BLOOD PRESSURE: 84 MMHG

## 2018-07-25 DIAGNOSIS — Z00.00 ENCOUNTER FOR ROUTINE ADULT HEALTH EXAMINATION WITHOUT ABNORMAL FINDINGS: Primary | ICD-10-CM

## 2018-07-25 PROCEDURE — 99396 PREV VISIT EST AGE 40-64: CPT | Performed by: INTERNAL MEDICINE

## 2018-07-25 NOTE — MR AVS SNAPSHOT
"              After Visit Summary   7/25/2018    Milad Brown    MRN: [id number]           Patient Information     Date Of Birth          1965        Visit Information        Provider Department      7/25/2018 4:00 PM Kay Garnica MD [facility]        Today's Diagnoses     Encounter for routine adult health examination without abnormal findings    -  1       Follow-ups after your visit        Future tests that were ordered for you today     Open Future Orders        Priority Expected Expires Ordered    CBC with platelets differential Routine 7/25/2018 12/25/2018 7/25/2018    Lipid panel reflex to direct LDL Fasting Routine 7/25/2018 12/25/2018 7/25/2018    TSH with free T4 reflex Routine 7/25/2018 12/25/2018 7/25/2018    Comprehensive metabolic panel Routine 7/25/2018 12/25/2018 7/25/2018            Who to contact     If you have questions or need follow up information about today's clinic visit or your schedule please contact [facility] directly at [phone].  Normal or non-critical lab and imaging results will be communicated to you by Avesohart, letter or phone within 4 business days after the clinic has received the results. If you do not hear from us within 7 days, please contact the clinic through Filementt or phone. If you have a critical or abnormal lab result, we will notify you by phone as soon as possible.  Submit refill requests through Zilyo or call your pharmacy and they will forward the refill request to us. Please allow 3 business days for your refill to be completed.          Additional Information About Your Visit        Avesohart Information     Zilyo lets you send messages to your doctor, view your test results, renew your prescriptions, schedule appointments and more. To sign up, go to [email or web address] . Click on \"Log in\" on the left side of the screen, which will take you to the Welcome page. Then click on \"Sign up Now\" on the right " "side of the page.     You will be asked to enter the access code listed below, as well as some personal information. Please follow the directions to create your username and password.     Your access code is: [email or web address]  Expires: 9/10/2018  2:35 PM     Your access code will  in 90 days. If you need help or a new code, please call your [place] clinic or [phone].        Care EveryWhere ID     This is your Care EveryWhere ID. This could be used by other organizations to access your [place] medical records  [id number]        Your Vitals Were     Pulse Temperature Respirations Height Pulse Oximetry BMI (Body Mass Index)    64 98.9  F (37.2  C) (Oral) 12 6' 5\" (1.956 m) 98% 27.99 kg/m2       Blood Pressure from Last 3 Encounters:   18 130/84   18 124/72   17 118/70    Weight from Last 3 Encounters:   18 236 lb (107 kg)   18 240 lb (108.9 kg)   17 238 lb 14.4 oz (108.4 kg)               Primary Care Provider Office Phone # Fax #    Kay Garnica [phone]       [address] [facility] [place] [address]        Equal Access to Services     [facility] AH: Hadii aad ku hadasho Soomaali, waaxda luqadaha, qaybta kaalmada adeegyada, gregorio cruz haychetan ma . So [facility] [phone].    ATENCIÓN: Si habla español, tiene a bautista disposición servicios gratuitos de asistencia lingüística. Llame al [phone].    We comply with applicable federal civil rights laws and [place] laws. We do not discriminate on the basis of race, color, national origin, age, disability, sex, sexual orientation, or gender identity.            Thank you!     Thank you for choosing [facility]  for your care. Our goal is always to provide you with excellent care. Hearing back from our patients is one way we can continue to improve our services. Please take a few minutes to complete the written survey that you may receive in the mail after your visit " with us. Thank you!             Your Updated Medication List - Protect others around you: Learn how to safely use, store and throw away your medicines at www.disposemymeds.org.      Notice  As of 7/25/2018  4:27 PM    You have not been prescribed any medications.

## 2018-07-25 NOTE — PROGRESS NOTES
SUBJECTIVE:   CC: Milad Brown is an 52 year old male who presents for preventative health visit.     Physical   Annual:     Getting at least 3 servings of Calcium per day:  Yes    Bi-annual eye exam:  Yes    Dental care twice a year:  Yes    Sleep apnea or symptoms of sleep apnea:  None    Diet:  Regular (no restrictions)    Frequency of exercise:  2-3 days/week    Duration of exercise:  15-30 minutes    Taking medications regularly:  Yes    Medication side effects:  Lightheadedness    Additional concerns today:  No      Last Monday twisted the knee, and has had pain since.       Today's PHQ-2 Score:   PHQ-2 ( 1999 Pfizer) 7/25/2018   Q1: Little interest or pleasure in doing things 0   Q2: Feeling down, depressed or hopeless 0   PHQ-2 Score 0   Q1: Little interest or pleasure in doing things Not at all   Q2: Feeling down, depressed or hopeless Not at all   PHQ-2 Score 0       Abuse: Current or Past(Physical, Sexual or Emotional)- No  Do you feel safe in your environment - Yes    Social History   Substance Use Topics     Smoking status: Never Smoker     Smokeless tobacco: Never Used     Alcohol use No     Alcohol Use 7/25/2018   If you drink alcohol do you typically have greater than 3 drinks per day OR greater than 7 drinks per week? No       Last PSA:   PSA   Date Value Ref Range Status   07/12/2017 0.92 0 - 4 ug/L Final     Comment:     Assay Method:  Chemiluminescence using Siemens Vista analyzer       Reviewed orders with patient. Reviewed health maintenance and updated orders accordingly - Yes  Labs reviewed in EPIC    Reviewed and updated as needed this visit by clinical staff         Reviewed and updated as needed this visit by Provider            Review of Systems  CONSTITUTIONAL: NEGATIVE for fever, chills, change in weight  INTEGUMENTARY/SKIN: NEGATIVE for worrisome rashes, moles or lesions  EYES: NEGATIVE for vision changes or irritation  ENT: NEGATIVE for ear, mouth and throat problems  RESP:  "NEGATIVE for significant cough or SOB  CV: NEGATIVE for chest pain, palpitations or peripheral edema  GI: NEGATIVE for nausea, abdominal pain, heartburn, or change in bowel habits   male: negative for dysuria, hematuria, decreased urinary stream, erectile dysfunction, urethral discharge  MUSCULOSKELETAL: NEGATIVE for significant arthralgias or myalgia  NEURO: NEGATIVE for weakness, dizziness or paresthesias  PSYCHIATRIC: NEGATIVE for changes in mood or affect; has not slept well the past 3 nights with his mother-in-law in town.    OBJECTIVE:   There were no vitals taken for this visit.   /84  Pulse 64  Temp 98.9  F (37.2  C) (Oral)  Resp 12  Ht 6' 5\" (1.956 m)  Wt 236 lb (107 kg)  SpO2 98%  BMI 27.99 kg/m2      Physical Exam  GENERAL: healthy, alert and no distress  EYES: Eyes grossly normal to inspection, PERRL and conjunctivae and sclerae normal  HENT: ear canals and TM's normal, nose and mouth without ulcers or lesions  NECK: no adenopathy, no asymmetry, masses, or scars and thyroid normal to palpation  RESP: lungs clear to auscultation - no rales, rhonchi or wheezes  CV: regular rate and rhythm, normal S1 S2, no S3 or S4, no murmur, click or rub, no peripheral edema and peripheral pulses strong  ABDOMEN: soft, nontender, no hepatosplenomegaly, no masses and bowel sounds normal  MS: no gross musculoskeletal defects noted, no edema  SKIN: no suspicious lesions or rashes  NEURO: Normal strength and tone, mentation intact and speech normal  PSYCH: mentation appears normal, affect normal/bright        ASSESSMENT/PLAN:     (Z00.00) Encounter for routine adult health examination without abnormal findings  (primary encounter diagnosis)  Comment: not fasting currently  Plan: CBC with platelets differential, Lipid panel         reflex to direct LDL Fasting, TSH with free T4         reflex, Comprehensive metabolic panel              COUNSELING:   Reviewed preventive health counseling, as reflected in patient " "instructions    BP Readings from Last 1 Encounters:   02/20/18 124/72     Estimated body mass index is 28.46 kg/(m^2) as calculated from the following:    Height as of 2/20/18: 6' 5\" (1.956 m).    Weight as of 2/20/18: 240 lb (108.9 kg).           reports that he has never smoked. He has never used smokeless tobacco.      Counseling Resources:  ATP IV Guidelines  Pooled Cohorts Equation Calculator  FRAX Risk Assessment  ICSI Preventive Guidelines  Dietary Guidelines for Americans, 2010  USDA's MyPlate  ASA Prophylaxis  Lung CA Screening    Kay Garnica MD  [facility]  Answers for HPI/ROS submitted by the patient on 7/25/2018   PHQ-2 Score: 0    "

## 2018-07-25 NOTE — NURSING NOTE
"Chief Complaint   Patient presents with     Physical     not fasting     initial /84  Pulse 64  Temp 98.9  F (37.2  C) (Oral)  Resp 12  Ht 6' 5\" (1.956 m)  Wt 236 lb (107 kg)  SpO2 98%  BMI 27.99 kg/m2 Estimated body mass index is 27.99 kg/(m^2) as calculated from the following:    Height as of this encounter: 6' 5\" (1.956 m).    Weight as of this encounter: 236 lb (107 kg)..  bp completed using cuff size large  GEORGE MARIA LPN  "

## 2018-07-25 NOTE — LETTER
To whom it may concern:    Milad Brown is a patient under my care.  He was seen on 7/25/18 for a medical appointment.  Please excuse him from work 7/25/18 through 7/27/18.   He can return to work on 7/30/18.       Please call with any questions.          Kay Garnica MD

## 2018-10-28 ENCOUNTER — OFFICE VISIT (OUTPATIENT)
Dept: URGENT CARE | Facility: URGENT CARE | Age: 53
End: 2018-10-28
Payer: COMMERCIAL

## 2018-10-28 VITALS
BODY MASS INDEX: 27.84 KG/M2 | SYSTOLIC BLOOD PRESSURE: 138 MMHG | DIASTOLIC BLOOD PRESSURE: 88 MMHG | RESPIRATION RATE: 12 BRPM | OXYGEN SATURATION: 98 % | WEIGHT: 234.8 LBS | HEART RATE: 66 BPM | TEMPERATURE: 97.8 F

## 2018-10-28 DIAGNOSIS — J30.2 SEASONAL ALLERGIC RHINITIS, UNSPECIFIED TRIGGER: ICD-10-CM

## 2018-10-28 DIAGNOSIS — B35.4 TINEA CORPORIS: Primary | ICD-10-CM

## 2018-10-28 PROCEDURE — 99214 OFFICE O/P EST MOD 30 MIN: CPT | Performed by: PHYSICIAN ASSISTANT

## 2018-10-28 RX ORDER — FLUTICASONE PROPIONATE 50 MCG
2 SPRAY, SUSPENSION (ML) NASAL DAILY
Qty: 1 BOTTLE | Refills: 1 | Status: SHIPPED | OUTPATIENT
Start: 2018-10-28 | End: 2019-01-24

## 2018-10-28 RX ORDER — KETOCONAZOLE 20 MG/G
CREAM TOPICAL 2 TIMES DAILY
Qty: 30 G | Refills: 1 | Status: SHIPPED | OUTPATIENT
Start: 2018-10-28 | End: 2019-01-24

## 2018-10-28 RX ORDER — CETIRIZINE HYDROCHLORIDE 10 MG/1
10 TABLET ORAL EVERY EVENING
Qty: 30 TABLET | Refills: 1 | Status: SHIPPED | OUTPATIENT
Start: 2018-10-28 | End: 2019-01-24

## 2018-10-28 NOTE — PROGRESS NOTES
SUBJECTIVE:   Milad Brown is a 53 year old male presenting with a chief complaint of   1) runny nose, sneezing and dry cough for the past 2 weeks.    No fevers.    Watery eyes  2) rash on left lateral chest, rash does not itch or hurt.  Onset was 2 days ago.  Onset of symptoms was as a above.  Course of illness is worsening.    Severity moderate  Current and Associated symptoms: as above  Treatment measures tried include mucinex.  Predisposing factors include none.    No past medical history on file.  Patient Active Problem List   Diagnosis     Herpes zoster     CARDIOVASCULAR SCREENING; LDL GOAL LESS THAN 160     Tinnitus, bilateral     Social History   Substance Use Topics     Smoking status: Never Smoker     Smokeless tobacco: Never Used     Alcohol use No       ROS:  CONSTITUTIONAL:NEGATIVE for fever, chills, change in weight  INTEGUMENTARY/SKIN: as above  EYES: NEGATIVE for vision changes or irritation  ENT/MOUTH: as above  RESP:as per HPI  CV: NEGATIVE for chest pain, palpitations or peripheral edema  GI: NEGATIVE for nausea, abdominal pain, heartburn, or change in bowel habits  MUSCULOSKELETAL: NEGATIVE for significant arthralgias or myalgia  NEURO: NEGATIVE for weakness, dizziness or paresthesias    OBJECTIVE  :/88  Pulse 66  Temp 97.8  F (36.6  C) (Oral)  Resp 12  Wt 234 lb 12.8 oz (106.5 kg)  SpO2 98%  BMI 27.84 kg/m2  GENERAL APPEARANCE: healthy, alert and no distress  EYES: EOMI,  PERRL, conjunctiva clear.  Palpebral conjunctival cobblestoning bilaterally  HENT: ear canals and TM's normal.  Nose and mouth without ulcers, erythema or lesions  HENT: nasal turbinates boggy with bluish hue and rhinorrhea clear  NECK: supple, nontender, no lymphadenopathy  RESP: lungs clear to auscultation - no rales, rhonchi or wheezes  CV: regular rates and rhythm, normal S1 S2, no murmur noted  ABDOMEN:  soft, nontender, no HSM or masses and bowel sounds normal  NEURO: Normal strength and tone, sensory  exam grossly normal,  normal speech and mentation  SKIN: erythematous raised annular lesion on left lateral chest.  Ring is wide, central clearing is only about 1cm.  Ring diameter is about 3cm.  No vesicles.      (B35.4) Tinea corporis  (primary encounter diagnosis)  Comment:   Plan: ketoconazole (NIZORAL) 2 % cream            (J30.2) Seasonal allergic rhinitis, unspecified trigger  Comment:   Plan: cetirizine (ZYRTEC) 10 MG tablet, fluticasone         (FLONASE) 50 MCG/ACT spray            Use allergy medications for at least 2-4 weeks.      Follow up with primary clinic should symptoms persist or worsen.

## 2018-10-28 NOTE — PATIENT INSTRUCTIONS
"  (B35.4) Tinea corporis  (primary encounter diagnosis)  Comment: \"ring worm\"  Plan: ketoconazole (NIZORAL) 2 % cream            (J30.2) Seasonal allergic rhinitis, unspecified trigger  Comment:   Plan: cetirizine (ZYRTEC) 10 MG tablet, fluticasone         (FLONASE) 50 MCG/ACT spray            Use allergy medications for at least 2-4 weeks.      Follow up with primary clinic should symptoms persist or worsen.        "

## 2018-10-28 NOTE — MR AVS SNAPSHOT
"              After Visit Summary   10/28/2018    Milad Brown    MRN: [id number]           Patient Information     Date Of Birth          1965        Visit Information        Provider Department      10/28/2018 11:35 AM Kathe Villar PA-C [facility]        Today's Diagnoses     Tinea corporis    -  1    Seasonal allergic rhinitis, unspecified trigger          Care Instructions      (B35.4) Tinea corporis  (primary encounter diagnosis)  Comment: \"ring worm\"  Plan: ketoconazole (NIZORAL) 2 % cream            (J30.2) Seasonal allergic rhinitis, unspecified trigger  Comment:   Plan: cetirizine (ZYRTEC) 10 MG tablet, fluticasone         (FLONASE) 50 MCG/ACT spray            Use allergy medications for at least 2-4 weeks.      Follow up with primary clinic should symptoms persist or worsen.                Follow-ups after your visit        Who to contact     If you have questions or need follow up information about today's clinic visit or your schedule please contact [facility] directly at [phone].  Normal or non-critical lab and imaging results will be communicated to you by MyChart, letter or phone within 4 business days after the clinic has received the results. If you do not hear from us within 7 days, please contact the clinic through MyChart or phone. If you have a critical or abnormal lab result, we will notify you by phone as soon as possible.  Submit refill requests through [employer] or call your pharmacy and they will forward the refill request to us. Please allow 3 business days for your refill to be completed.          Additional Information About Your Visit        Care EveryWhere ID     This is your Care EveryWhere ID. This could be used by other organizations to access your [place] medical records  [id number]        Your Vitals Were     Pulse Temperature Respirations Pulse Oximetry BMI (Body Mass Index)       66 97.8  F " (36.6  C) (Oral) 12 98% 27.84 kg/m2        Blood Pressure from Last 3 Encounters:   10/28/18 138/88   07/25/18 130/84   02/20/18 124/72    Weight from Last 3 Encounters:   10/28/18 234 lb 12.8 oz (106.5 kg)   07/25/18 236 lb (107 kg)   02/20/18 240 lb (108.9 kg)              Today, you had the following     No orders found for display         Today's Medication Changes          These changes are accurate as of 10/28/18 12:25 PM.  If you have any questions, ask your nurse or doctor.               Start taking these medicines.        Dose/Directions    cetirizine 10 MG tablet   Commonly known as:  zyrTEC   Used for:  Seasonal allergic rhinitis, unspecified trigger   Started by:  Kathe Villar PA-C        Dose:  10 mg   Take 1 tablet (10 mg) by mouth every evening   Quantity:  30 tablet   Refills:  1       fluticasone 50 MCG/ACT spray   Commonly known as:  FLONASE   Used for:  Seasonal allergic rhinitis, unspecified trigger   Started by:  Kathe Villar PA-C        Dose:  2 spray   Spray 2 sprays into both nostrils daily   Quantity:  1 Bottle   Refills:  1       ketoconazole 2 % cream   Commonly known as:  NIZORAL   Used for:  Tinea corporis   Started by:  Kathe Villar PA-C        Apply topically 2 times daily   Quantity:  30 g   Refills:  1            Where to get your medicines      These medications were sent to [facility] [address] [place] [id number]     Phone:  [phone]     cetirizine 10 MG tablet    fluticasone 50 MCG/ACT spray    ketoconazole 2 % cream                Primary Care Provider Office Phone # Fax #    Kay Garnica [phone]       [address]  [facility] [address]        Equal Access to Services     ALTA GUSTAFSON AH: Hollie villa Sokarla, waaxda luqadaha, qaybta kaalflower villeda, gregorio ma  ah. So [facility] [phone].    ATENCIÓN: Si misa evangelista, tiene a bautista disposición servicios gratuitos de asistencia lingüística. Peterson al [phone].    We comply with applicable federal civil rights laws and [place] laws. We do not discriminate on the basis of race, color, national origin, age, disability, sex, sexual orientation, or gender identity.            Thank you!     Thank you for choosing [facility]  for your care. Our goal is always to provide you with excellent care. Hearing back from our patients is one way we can continue to improve our services. Please take a few minutes to complete the written survey that you may receive in the mail after your visit with us. Thank you!             Your Updated Medication List - Protect others around you: Learn how to safely use, store and throw away your medicines at www.disposemymeds.org.          This list is accurate as of 10/28/18 12:25 PM.  Always use your most recent med list.                   Brand Name Dispense Instructions for use Diagnosis    cetirizine 10 MG tablet    zyrTEC    30 tablet    Take 1 tablet (10 mg) by mouth every evening    Seasonal allergic rhinitis, unspecified trigger       DAYQUIL OR           fluticasone 50 MCG/ACT spray    FLONASE    1 Bottle    Spray 2 sprays into both nostrils daily    Seasonal allergic rhinitis, unspecified trigger       ketoconazole 2 % cream    NIZORAL    30 g    Apply topically 2 times daily    Tinea corporis       NYQUIL PO

## 2019-01-24 ENCOUNTER — APPOINTMENT (OUTPATIENT)
Dept: CARDIOLOGY | Facility: CLINIC | Age: 54
End: 2019-01-24
Payer: COMMERCIAL

## 2019-01-24 ENCOUNTER — APPOINTMENT (OUTPATIENT)
Dept: CT IMAGING | Facility: CLINIC | Age: 54
End: 2019-01-24
Payer: COMMERCIAL

## 2019-01-24 ENCOUNTER — HOSPITAL ENCOUNTER (OUTPATIENT)
Facility: CLINIC | Age: 54
Setting detail: OBSERVATION
Discharge: HOME | End: 2019-01-24
Attending: EMERGENCY MEDICINE | Admitting: INTERNAL MEDICINE
Payer: COMMERCIAL

## 2019-01-24 ENCOUNTER — APPOINTMENT (OUTPATIENT)
Dept: MRI IMAGING | Facility: CLINIC | Age: 54
End: 2019-01-24
Payer: COMMERCIAL

## 2019-01-24 VITALS
BODY MASS INDEX: 27.21 KG/M2 | OXYGEN SATURATION: 100 % | HEART RATE: 65 BPM | TEMPERATURE: 97.5 F | RESPIRATION RATE: 16 BRPM | HEIGHT: 77 IN | WEIGHT: 230.44 LBS | SYSTOLIC BLOOD PRESSURE: 154 MMHG | DIASTOLIC BLOOD PRESSURE: 97 MMHG

## 2019-01-24 DIAGNOSIS — I63.9 ACUTE CVA (CEREBROVASCULAR ACCIDENT) (H): ICD-10-CM

## 2019-01-24 DIAGNOSIS — R29.898 WEAKNESS OF LEFT UPPER EXTREMITY: ICD-10-CM

## 2019-01-24 DIAGNOSIS — G45.9 TIA (TRANSIENT ISCHEMIC ATTACK): Primary | ICD-10-CM

## 2019-01-24 LAB
ANION GAP SERPL CALCULATED.3IONS-SCNC: 7 MMOL/L (ref 3–14)
APTT PPP: 25 SEC (ref 22–37)
BASOPHILS # BLD AUTO: 0 10E9/L (ref 0–0.2)
BASOPHILS NFR BLD AUTO: 0.8 %
BUN SERPL-MCNC: 18 MG/DL (ref 7–30)
CALCIUM SERPL-MCNC: 8.4 MG/DL (ref 8.5–10.1)
CHLORIDE SERPL-SCNC: 108 MMOL/L (ref 94–109)
CHOLEST SERPL-MCNC: 178 MG/DL
CO2 SERPL-SCNC: 25 MMOL/L (ref 20–32)
CREAT SERPL-MCNC: 0.96 MG/DL (ref 0.66–1.25)
DIFFERENTIAL METHOD BLD: ABNORMAL
EOSINOPHIL # BLD AUTO: 0.2 10E9/L (ref 0–0.7)
EOSINOPHIL NFR BLD AUTO: 3.7 %
ERYTHROCYTE [DISTWIDTH] IN BLOOD BY AUTOMATED COUNT: 13.6 % (ref 10–15)
GFR SERPL CREATININE-BSD FRML MDRD: 88 ML/MIN/{1.73_M2}
GLUCOSE SERPL-MCNC: 85 MG/DL (ref 70–99)
HBA1C MFR BLD: 6.3 % (ref 0–5.6)
HCT VFR BLD AUTO: 46.6 % (ref 40–53)
HDLC SERPL-MCNC: 77 MG/DL
HGB BLD-MCNC: 14.4 G/DL (ref 13.3–17.7)
IMM GRANULOCYTES # BLD: 0 10E9/L (ref 0–0.4)
IMM GRANULOCYTES NFR BLD: 0.2 %
INR PPP: 0.87 (ref 0.86–1.14)
INTERPRETATION ECG - MUSE: NORMAL
LDLC SERPL CALC-MCNC: 90 MG/DL
LYMPHOCYTES # BLD AUTO: 2.5 10E9/L (ref 0.8–5.3)
LYMPHOCYTES NFR BLD AUTO: 47.5 %
MCH RBC QN AUTO: 27.4 PG (ref 26.5–33)
MCHC RBC AUTO-ENTMCNC: 30.9 G/DL (ref 31.5–36.5)
MCV RBC AUTO: 89 FL (ref 78–100)
MONOCYTES # BLD AUTO: 0.6 10E9/L (ref 0–1.3)
MONOCYTES NFR BLD AUTO: 11.8 %
NEUTROPHILS # BLD AUTO: 1.9 10E9/L (ref 1.6–8.3)
NEUTROPHILS NFR BLD AUTO: 36 %
NONHDLC SERPL-MCNC: 101 MG/DL
NRBC # BLD AUTO: 0 10*3/UL
NRBC BLD AUTO-RTO: 0 /100
PLATELET # BLD AUTO: 209 10E9/L (ref 150–450)
POTASSIUM SERPL-SCNC: 3.9 MMOL/L (ref 3.4–5.3)
RBC # BLD AUTO: 5.25 10E12/L (ref 4.4–5.9)
SODIUM SERPL-SCNC: 140 MMOL/L (ref 133–144)
TRIGL SERPL-MCNC: 56 MG/DL
TROPONIN I SERPL-MCNC: <0.015 UG/L (ref 0–0.04)
WBC # BLD AUTO: 5.2 10E9/L (ref 4–11)

## 2019-01-24 PROCEDURE — 70450 CT HEAD/BRAIN W/O DYE: CPT | Mod: 59

## 2019-01-24 PROCEDURE — 25000132 ZZH RX MED GY IP 250 OP 250 PS 637: Performed by: INTERNAL MEDICINE

## 2019-01-24 PROCEDURE — 80048 BASIC METABOLIC PNL TOTAL CA: CPT | Performed by: EMERGENCY MEDICINE

## 2019-01-24 PROCEDURE — 93306 TTE W/DOPPLER COMPLETE: CPT | Mod: 26 | Performed by: INTERNAL MEDICINE

## 2019-01-24 PROCEDURE — 84484 ASSAY OF TROPONIN QUANT: CPT | Performed by: EMERGENCY MEDICINE

## 2019-01-24 PROCEDURE — 70553 MRI BRAIN STEM W/O & W/DYE: CPT

## 2019-01-24 PROCEDURE — 25000128 H RX IP 250 OP 636: Performed by: EMERGENCY MEDICINE

## 2019-01-24 PROCEDURE — 0042T CT HEAD PERFUSION WITH CONTRAST: CPT

## 2019-01-24 PROCEDURE — G0378 HOSPITAL OBSERVATION PER HR: HCPCS

## 2019-01-24 PROCEDURE — G0008 ADMIN INFLUENZA VIRUS VAC: HCPCS

## 2019-01-24 PROCEDURE — 40000894 ZZH STATISTIC OT IP EVAL DEFER: Performed by: STUDENT IN AN ORGANIZED HEALTH CARE EDUCATION/TRAINING PROGRAM

## 2019-01-24 PROCEDURE — 99207 ZZC CDG-HISTORY COMP: MEETS EXP. PROBLEM FOCUSED - DOWN CODED LACK OF HPI: CPT | Performed by: INTERNAL MEDICINE

## 2019-01-24 PROCEDURE — 80061 LIPID PANEL: CPT | Performed by: EMERGENCY MEDICINE

## 2019-01-24 PROCEDURE — 85025 COMPLETE CBC W/AUTO DIFF WBC: CPT | Performed by: EMERGENCY MEDICINE

## 2019-01-24 PROCEDURE — 70498 CT ANGIOGRAPHY NECK: CPT

## 2019-01-24 PROCEDURE — 99285 EMERGENCY DEPT VISIT HI MDM: CPT | Mod: 25

## 2019-01-24 PROCEDURE — 25000132 ZZH RX MED GY IP 250 OP 250 PS 637: Performed by: EMERGENCY MEDICINE

## 2019-01-24 PROCEDURE — 83036 HEMOGLOBIN GLYCOSYLATED A1C: CPT | Performed by: EMERGENCY MEDICINE

## 2019-01-24 PROCEDURE — 85730 THROMBOPLASTIN TIME PARTIAL: CPT | Performed by: EMERGENCY MEDICINE

## 2019-01-24 PROCEDURE — A9585 GADOBUTROL INJECTION: HCPCS | Performed by: EMERGENCY MEDICINE

## 2019-01-24 PROCEDURE — 90682 RIV4 VACC RECOMBINANT DNA IM: CPT | Performed by: INTERNAL MEDICINE

## 2019-01-24 PROCEDURE — 25000132 ZZH RX MED GY IP 250 OP 250 PS 637: Performed by: PHYSICIAN ASSISTANT

## 2019-01-24 PROCEDURE — 25500064 ZZH RX 255 OP 636: Performed by: EMERGENCY MEDICINE

## 2019-01-24 PROCEDURE — 40000264 ECHOCARDIOGRAM COMPLETE

## 2019-01-24 PROCEDURE — 93005 ELECTROCARDIOGRAM TRACING: CPT

## 2019-01-24 PROCEDURE — 85610 PROTHROMBIN TIME: CPT | Performed by: EMERGENCY MEDICINE

## 2019-01-24 PROCEDURE — 25000128 H RX IP 250 OP 636: Performed by: INTERNAL MEDICINE

## 2019-01-24 PROCEDURE — 99219 ZZC INITIAL OBSERVATION CARE,LEVL II: CPT | Performed by: INTERNAL MEDICINE

## 2019-01-24 RX ORDER — ASPIRIN 325 MG
325 TABLET ORAL DAILY
Qty: 100 TABLET | Refills: 1
Start: 2019-01-25 | End: 2019-01-24

## 2019-01-24 RX ORDER — ATORVASTATIN CALCIUM 10 MG/1
10 TABLET, FILM COATED ORAL EVERY EVENING
Qty: 30 TABLET | Refills: 3 | Status: SHIPPED | OUTPATIENT
Start: 2019-01-24 | End: 2019-03-06 | Stop reason: SINTOL

## 2019-01-24 RX ORDER — ATORVASTATIN CALCIUM 10 MG/1
10 TABLET, FILM COATED ORAL EVERY EVENING
Status: DISCONTINUED | OUTPATIENT
Start: 2019-01-24 | End: 2019-01-24 | Stop reason: HOSPADM

## 2019-01-24 RX ORDER — ASPIRIN 325 MG
325 TABLET ORAL DAILY
Qty: 90 TABLET | Refills: 3 | Status: SHIPPED | OUTPATIENT
Start: 2019-01-25 | End: 2019-01-24

## 2019-01-24 RX ORDER — ONDANSETRON 4 MG/1
4 TABLET, ORALLY DISINTEGRATING ORAL EVERY 6 HOURS PRN
Status: DISCONTINUED | OUTPATIENT
Start: 2019-01-24 | End: 2019-01-24 | Stop reason: HOSPADM

## 2019-01-24 RX ORDER — ACETAMINOPHEN 650 MG/1
650 SUPPOSITORY RECTAL EVERY 4 HOURS PRN
Status: DISCONTINUED | OUTPATIENT
Start: 2019-01-24 | End: 2019-01-24 | Stop reason: HOSPADM

## 2019-01-24 RX ORDER — GADOBUTROL 604.72 MG/ML
10 INJECTION INTRAVENOUS ONCE
Status: COMPLETED | OUTPATIENT
Start: 2019-01-24 | End: 2019-01-24

## 2019-01-24 RX ORDER — ONDANSETRON 2 MG/ML
4 INJECTION INTRAMUSCULAR; INTRAVENOUS EVERY 6 HOURS PRN
Status: DISCONTINUED | OUTPATIENT
Start: 2019-01-24 | End: 2019-01-24 | Stop reason: HOSPADM

## 2019-01-24 RX ORDER — CLOPIDOGREL BISULFATE 75 MG/1
75 TABLET ORAL DAILY
Qty: 21 TABLET | Refills: 0 | Status: SHIPPED | OUTPATIENT
Start: 2019-01-24 | End: 2019-10-23

## 2019-01-24 RX ORDER — LIDOCAINE 40 MG/G
CREAM TOPICAL
Status: DISCONTINUED | OUTPATIENT
Start: 2019-01-24 | End: 2019-01-24

## 2019-01-24 RX ORDER — ATORVASTATIN CALCIUM 40 MG/1
40 TABLET, FILM COATED ORAL EVERY EVENING
Status: DISCONTINUED | OUTPATIENT
Start: 2019-01-24 | End: 2019-01-24

## 2019-01-24 RX ORDER — POLYETHYLENE GLYCOL 3350 17 G/17G
17 POWDER, FOR SOLUTION ORAL DAILY PRN
Status: DISCONTINUED | OUTPATIENT
Start: 2019-01-24 | End: 2019-01-24 | Stop reason: HOSPADM

## 2019-01-24 RX ORDER — ACETAMINOPHEN 325 MG/1
650 TABLET ORAL EVERY 4 HOURS PRN
Status: DISCONTINUED | OUTPATIENT
Start: 2019-01-24 | End: 2019-01-24 | Stop reason: HOSPADM

## 2019-01-24 RX ORDER — CLOPIDOGREL BISULFATE 75 MG/1
75 TABLET ORAL ONCE
Status: DISCONTINUED | OUTPATIENT
Start: 2019-01-24 | End: 2019-01-24

## 2019-01-24 RX ORDER — ASPIRIN 81 MG/1
81 TABLET, CHEWABLE ORAL DAILY
Qty: 90 TABLET | Refills: 3 | Status: SHIPPED | OUTPATIENT
Start: 2019-01-25

## 2019-01-24 RX ORDER — CLOPIDOGREL BISULFATE 75 MG/1
75 TABLET ORAL DAILY
Status: DISCONTINUED | OUTPATIENT
Start: 2019-01-25 | End: 2019-01-24 | Stop reason: HOSPADM

## 2019-01-24 RX ORDER — ASPIRIN 325 MG
325 TABLET ORAL DAILY
Status: DISCONTINUED | OUTPATIENT
Start: 2019-01-24 | End: 2019-01-24

## 2019-01-24 RX ORDER — ASPIRIN 325 MG
325 TABLET ORAL ONCE
Status: COMPLETED | OUTPATIENT
Start: 2019-01-24 | End: 2019-01-24

## 2019-01-24 RX ORDER — ATORVASTATIN CALCIUM 10 MG/1
10 TABLET, FILM COATED ORAL EVERY EVENING
Qty: 30 TABLET | Refills: 3
Start: 2019-01-24 | End: 2019-01-24

## 2019-01-24 RX ORDER — ASPIRIN 81 MG/1
81 TABLET, CHEWABLE ORAL DAILY
Status: DISCONTINUED | OUTPATIENT
Start: 2019-01-25 | End: 2019-01-24 | Stop reason: HOSPADM

## 2019-01-24 RX ORDER — NALOXONE HYDROCHLORIDE 0.4 MG/ML
.1-.4 INJECTION, SOLUTION INTRAMUSCULAR; INTRAVENOUS; SUBCUTANEOUS
Status: DISCONTINUED | OUTPATIENT
Start: 2019-01-24 | End: 2019-01-24 | Stop reason: HOSPADM

## 2019-01-24 RX ORDER — CLOPIDOGREL BISULFATE 75 MG/1
75 TABLET ORAL DAILY
Qty: 21 TABLET | Refills: 0
Start: 2019-01-24 | End: 2019-01-24

## 2019-01-24 RX ORDER — CLOPIDOGREL BISULFATE 75 MG/1
300 TABLET ORAL ONCE
Status: COMPLETED | OUTPATIENT
Start: 2019-01-24 | End: 2019-01-24

## 2019-01-24 RX ORDER — IOPAMIDOL 755 MG/ML
500 INJECTION, SOLUTION INTRAVASCULAR ONCE
Status: COMPLETED | OUTPATIENT
Start: 2019-01-24 | End: 2019-01-24

## 2019-01-24 RX ADMIN — CLOPIDOGREL BISULFATE 300 MG: 75 TABLET, FILM COATED ORAL at 18:01

## 2019-01-24 RX ADMIN — SODIUM CHLORIDE 500 ML: 9 INJECTION, SOLUTION INTRAVENOUS at 05:48

## 2019-01-24 RX ADMIN — ASPIRIN 325 MG ORAL TABLET 325 MG: 325 PILL ORAL at 05:45

## 2019-01-24 RX ADMIN — ASPIRIN 325 MG ORAL TABLET 325 MG: 325 PILL ORAL at 10:15

## 2019-01-24 RX ADMIN — IOPAMIDOL 120 ML: 755 INJECTION, SOLUTION INTRAVENOUS at 05:28

## 2019-01-24 RX ADMIN — INFLUENZA A VIRUS A/MICHIGAN/45/2015 (H1N1) RECOMBINANT HEMAGGLUTININ ANTIGEN, INFLUENZA A VIRUS A/SINGAPORE/INFIMH-16-0019/2016 (H3N2) RECOMBINANT HEMAGGLUTININ ANTIGEN, INFLUENZA B VIRUS B/MARYLAND/15/2016 RECOMBINANT HEMAGGLUTININ ANTIGEN, AND INFLUENZA B VIRUS B/PHUKET/3073/2013 RECOMBINANT HEMAGGLUTININ ANTIGEN 0.5 ML: 45; 45; 45; 45 INJECTION INTRAMUSCULAR at 18:06

## 2019-01-24 RX ADMIN — GADOBUTROL 10 ML: 604.72 INJECTION INTRAVENOUS at 07:33

## 2019-01-24 RX ADMIN — SODIUM CHLORIDE 80 ML: 9 INJECTION, SOLUTION INTRAVENOUS at 05:28

## 2019-01-24 ASSESSMENT — ENCOUNTER SYMPTOMS
FEVER: 0
COUGH: 0
NUMBNESS: 1
DIZZINESS: 1
DIARRHEA: 0
WEAKNESS: 1
VOMITING: 0

## 2019-01-24 ASSESSMENT — MIFFLIN-ST. JEOR
SCORE: 2018.33
SCORE: 1997.64

## 2019-01-24 NOTE — H&P
Admitted:     01/24/2019      CHIEF COMPLAINT:  Left upper extremity tingling and weakness.      HISTORY OF PRESENT ILLNESS:  Mr. Brown is a very pleasant 53-year-old male with no significant medical history.  He presents to the hospital day for the above symptoms.  He reports he woke up at 1:00 a.m. from sleep with the above symptoms.  He initially noticed tingling of his left upper extremity.  He also noticed a feeling of dizziness and imbalance.  He reported that when he tried to pick something up with his left hand, he had weakness and the object fell out of his hand.  He has never had symptoms like this before.  He felt well when he went to bed earlier in the evening.      Symptoms are now much improved and essentially resolved at this point.      En route to the ER this evening, vital signs included a blood pressure initially 176/118, with a heart rate of 67.  Afebrile.  Saturation 99% on room air.  Workup in the ER included labs and imaging.  CBC normal.  Coagulation studies normal.  BMP normal.  Troponin is unremarkable.  Troponin is undetectable.  Imaging included a head CT scan, head and neck angiogram CT, and a CT angiogram of the head.  There was no stroke identified on head CT scan which appeared.  Normal perfusion on head CT angiogram.  Neck CT angiogram showed mild to moderate atherosclerotic changes in the ICAs bilaterally with no hemodynamically significant narrowing throughout the major neck vessels.      I spoke with Dr. Pop in the ER who is requesting admission of this patient to the hospital for possible TIA/CVA.      PAST MEDICAL HISTORY:  Nothing significant.      MEDICATIONS:  Multivitamin.      He takes no prescription medicines.      ALLERGIES:  None.      FAMILY HISTORY:  Diabetes mellitus in the family.      SOCIAL HISTORY:  The patient denies smoking or drinking.  Denies drug use.      REVIEW OF SYSTEMS:  See HPI for details.  Comprehensive greater than 10-point review of systems  is otherwise negative except for that detailed above.      PHYSICAL EXAMINATION:   VITAL SIGNS:  Blood pressure is currently 145/98 with a heart rate of 68.  Afebrile.  Saturation 99% on room air.   GENERAL:  The patient appears nontoxic, in no acute distress.  He is awake, alert and oriented x3.  He is a very pleasant gentleman.  He is a good historian.   HEENT:  Head is atraumatic.  Sclerae are white.  Eyelids are normal.  Conjunctivae normal.  Extraocular movements are intact.    NECK:  Supple.  No cervical, supraclavicular lymphadenopathy.   CARDIOVASCULAR:  Regular rate and rhythm.  No significant murmurs.  No lower extremity edema.  No bruits of his neck vessels.   LUNGS:  Clear to auscultation bilaterally.  No intercostal retractions.  No conversational dyspnea.   ABDOMEN:  Nontender, nondistended.  Soft.  No masses.  No organomegaly.   EXTREMITIES:  Show no edema.  Skin exam reveals no rash, no jaundice.  Skin is dry to touch. NEUROLOGIC:  Cranial nerves II-XII are intact.  Moves all extremities appropriately.  Strength testing including , biceps, triceps, dorsi and plantarflexion and hip flexors are all 5/5 bilaterally and symmetric.  Sensation intact to light touch in the upper and lower extremities bilaterally.  Nonfocal neurologic exam.   PSYCHIATRIC:  Awake, alert and oriented x3.  Mood and affect are normal and appropriate.      LABORATORY DATA AND IMAGING:  Reviewed above in HPI.      EKG, which I personally reviewed, shows a sinus rhythm with no acute ST changes or pathologic Q-waves.      ASSESSMENT AND PLAN:  Mr. Brown is a very pleasant 53-year-old male with no significant medical history who presents to the hospital today for concerns about the sudden onset of a left upper extremity weakness and tingling.  The symptoms have now improved and essentially resolved.  Concern is for possible TIA/CVA.   1.  Left lower extremity tingling and weakness:  Suspect possible TIA/CVA.  Symptoms now  resolved.   2.  Hypertension in the ER, no known history of this previously.      PLAN:   1.  Observation admission.  Symptoms have resolved at this point.   2.  Start atorvastatin in the setting of presumed TIA/CVA.   3.  Aspirin daily.   4.  Echocardiogram.   5.  Telemetry monitoring to rule out arrhythmia.   6.  Occupational Therapy consultation just to better assess his fine motor skills.  I do not think he needs physical therapy consult at this point.   7.  Continue to monitor blood pressure.  If remains elevated, would consider initiating therapy.         ANITHA JUDD MD             D: 2019   T: 2019   MT: NTS      Name:     SUNNY TOBIN   MRN:      [id number]        Account:      [id number]   :      1965        Admitted:     2019                   Document: [id number]       cc: Kay Garnica MD

## 2019-01-24 NOTE — ED NOTES
"[facility]  ED Nurse Handoff Report    Milad Brown is a 53 year old male   ED Chief complaint: Chest Pain  . ED Diagnosis:   Final diagnoses:   Acute CVA (cerebrovascular accident) (H)   Weakness of left upper extremity     Allergies: No Known Allergies    Code Status: not on file  Activity level - Baseline/Home:  Independent. Activity Level - Current:   Stand with Assist. Lift room needed: No. Bariatric: No   Needed: No   Isolation: No. Infection: Not Applicable.     Vital Signs:   Vitals:    01/24/19 0507 01/24/19 0517 01/24/19 0549 01/24/19 0600   BP:   (!) 144/98    Pulse:       Resp:    17   Temp: 97.8  F (36.6  C)      TempSrc: Axillary      SpO2:    99%   Weight:  106.6 kg (235 lb)     Height:  1.956 m (6' 5\")         Cardiac Rhythm:  ,      Pain level:    Patient confused: No. Patient Falls Risk: Yes.   Elimination Status: Has voided   Patient Report - Initial Complaint: Weakness and numbness. Focused Assessment: numbness to LUE  Tests Performed:   Labs Ordered and Resulted from Time of ED Arrival Up to the Time of Departure from the ED   CBC WITH PLATELETS DIFFERENTIAL - Abnormal; Notable for the following components:       Result Value    MCHC 30.9 (*)     All other components within normal limits   BASIC METABOLIC PANEL - Abnormal; Notable for the following components:    Calcium 8.4 (*)     All other components within normal limits   INR   PARTIAL THROMBOPLASTIN TIME   TROPONIN I   VITAL SIGNS AND NEURO CHECKS   ACTIVITY   PULSE OXIMETRY NURSING   PERIPHERAL IV CATHETER   ISTAT CREATININE NURSING POCT     . Abnormal Results:   CT Head w/o Contrast    (Results Pending)   CT Head Neck Angio w/o & w Contrast    (Results Pending)   CT Head Perfusion w Contrast    (Results Pending)   MR Brain w/o & w Contrast    (Results Pending)     .   Treatments provided: aSA 325, BOLUS 1L  Family Comments: na  OBS brochure/video discussed/provided to patient:  No  ED Medications: "   Medications   lidocaine 1 % 1 mL (not administered)   lidocaine (LMX4) cream (not administered)   sodium chloride (PF) 0.9% PF flush 3 mL (not administered)   sodium chloride (PF) 0.9% PF flush 3 mL (3 mLs Intracatheter Given 1/24/19 0548)   0.9% sodium chloride BOLUS (500 mLs Intravenous New Bag 1/24/19 0548)   0.9% sodium chloride BOLUS (0 mLs Intravenous Stopped 1/24/19 0530)   iopamidol (ISOVUE-370) solution 500 mL (120 mLs Intravenous Given 1/24/19 0528)   aspirin (ASA) tablet 325 mg (325 mg Oral Given 1/24/19 0545)     Drips infusing:  Yes  For the majority of the shift, the patient's behavior Green. Interventions performed were NA.     Severe Sepsis OR Septic Shock Diagnosis Present: No      ED Nurse Name/Phone Number: Tyson Del Rosario,   6:25 AM    RECEIVING UNIT ED HANDOFF REVIEW    Above ED Nurse Handoff Report was reviewed: Yes  Reviewed by: Eloina Kramer on January 24, 2019 at 8:33 AM

## 2019-01-24 NOTE — PLAN OF CARE
OT: Order received, chart reviewed, met with pt and wife present    Discharge Planner OT   Patient plan for discharge: home  Current status: pt admitted after episode of L UE weakness and tingly leading up to pt not being able to grasp an object at work without it dropping; pt being worked up for TIA; met with pt who demonstrated has returned to baseline independence and strength; pt is left hand dominant, able to grasp pen and write name per pt report he is at baseline; pt has been able to complete transfers and mobility, SBA without difficulty  Barriers to return to prior living situation: none anticipated  Recommendations for discharge: home with wife  Rationale for recommendations: no skilled needs, pt returned to baseline independence, no formal evaluation/treatment provided       Entered by: Tete Stallworth 01/24/2019 2:12 PM

## 2019-01-24 NOTE — PHARMACY-ADMISSION MEDICATION HISTORY
Admission medication history interview status for this patient is complete. See [facility] admission navigator for allergy information, prior to admission medications and immunization status.     Medication history interview source(s):Patient  Medication history resources (including written lists, pill bottles, clinic record):None  [facility] [address] [place] [address] [id number] AT [place] [address]    Changes made to PTA medication list:  Added: none  Deleted: cetirizine, flonase, ketoconazole cream  Changed: none    Prior to Admission medications    Not on File

## 2019-01-24 NOTE — PROGRESS NOTES
Vascular Neurology Update Note    The patient was seen earlier this morning by me..    Since then the patient had an echo which is not resulted yet. Unless obvious embolic source seen, plan is as below:    Updated recommendations:  - Load with Plavix 300mg, continue then 75mg x 21 days along with aspirin 81mg. After 21 days of Plavix done, continue aspirin 325mg daily indefinitely.  - 30 day cardiac event monitor upon discharge or ASAP    Ok to discharge home after echo resulted. F/u neuro 4-6 weeks.    Cora Price PA-C  Neurology  01/24/2019 3:56 PM  Pager: [phone]

## 2019-01-24 NOTE — PLAN OF CARE
PRIMARY DIAGNOSIS: Numbness/Tingling LUE, Stroke  OUTPATIENT/OBSERVATION GOALS TO BE MET BEFORE DISCHARGE:  ADLs back to baseline: Yes     Activity and level of assistance: Ambulating independently.     Pain status: Pain free.     Return to near baseline physical activity: Yes          Discharge Planner Nurse   Safe discharge environment identified: Yes  Barriers to discharge: No       Entered by: Eloina Smith 01/24/2019 12:36 PM    Please review provider order for any additional goals.   Nurse to notify provider when observation goals have been met and patient is ready for discharge.     Patient alert and oriented  Peripheral IV saline locked  Initial Tele-Stoke Consult complete  No numbness/tingling/weakness in LUE  Plan for echo, results pending  Possible discharge later today  Continue with plan of care

## 2019-01-24 NOTE — ED PROVIDER NOTES
"  History     Chief Complaint:  Left arm weakness      HPI   Milad Brown is a 53 year old male who presents for evaluation of left arm weakness and numbness. The patient reports that he typically wake up around 3:30 AM for work but today work up around 12:30 - 12:45 AM with pain in his left arm which he later reports as weakness and numbness. While he was getting dressed he noticed that the numbness was getting worse and that the strength in his arm was also declining. Patient also mentions that prior to leaving for work he stood up and felt dizzy. At work, the patient mentions that his left arm felt heavy and when his coworker told him to squeeze his fingers he was unable to do so. He dropped a pen he tried to .  The patient states that he was feeling normal when he went to bed at 8:30 PM last night. He denies fever, cough, diarrhea, vomiting, or facial numbness.     Allergies:  No known drug allergies     Medications:    Zyrtec  Flonase  Nizoral    Past Medical History:    The patient does not have any past pertinent medical history.    Past Surgical History:    History reviewed. No pertinent surgical history.    Family History:    Cancer  diabetes    Social History:  Smoking status: Never smoker  Alcohol use: No  Marital Status:       Review of Systems   Constitutional: Negative for fever.   Respiratory: Negative for cough.    Gastrointestinal: Negative for diarrhea and vomiting.   Neurological: Positive for dizziness, weakness and numbness.   All other systems reviewed and are negative.      Physical Exam     Patient Vitals for the past 24 hrs:   BP Temp Temp src Pulse Heart Rate Resp SpO2 Height Weight   01/24/19 0600 -- -- -- -- 60 17 99 % -- --   01/24/19 0549 (!) 144/98 -- -- -- -- -- -- -- --   01/24/19 0517 -- -- -- -- -- -- -- 1.956 m (6' 5\") 106.6 kg (235 lb)   01/24/19 0507 -- 97.8  F (36.6  C) Axillary -- -- -- -- -- --   01/24/19 0504 (!) 161/104 -- -- 68 -- 16 99 % -- -- "         Physical Exam  Nursing note and vitals reviewed.  Constitutional: Cooperative.   HENT:   Mouth/Throat: Mucous membranes are normal.   Eyes: Pupils are equal, round, and reactive to light. EOMI  Cardiovascular: Normal rate, regular rhythm and normal heart sounds.  No murmur.  Pulmonary/Chest: Effort normal and breath sounds normal. No respiratory distress. No wheezes. No rales.   Abdominal: Soft. Normal appearance and bowel sounds are normal. No distension. There is no tenderness. There is no rigidity and no guarding.   Neurological: Alert. 3/5 strength in his left upper extremity, 5/5 strength everywhere else, decreased sensation in left upper extremity compared to right. Difficulty with finger nose finger. CN 2-12 intact.    Skin: Skin is warm and dry. No rash noted.   Psychiatric: Normal mood and affect.     Emergency Department Course   ECG (05:01:10):  Rate 67 bpm. MI interval 160. QRS duration 92. QT/QTc 434/458. P-R-T axes 59 8 24. Normal sinus rhythm, minimal voltage criteria for LVH ma be normal variant, Interpreted at 0503 by Mirza Pop MD.      Imaging:  Radiographic findings were communicated with the patient who voiced understanding of the findings.    Head and neck CT angiogram with IV contrast  Conclusion:  Head CTA   1. Mild to moderate arthrosclerotic changes cavernous ICAs bilaterally.  2. Intracranial circulation is otherwise normal.  Neck CTA  1. No hemodynamically significant narrowing throughout major neck vessels.   As read by radiology     CT head w/o contrast  Conclusion:  1. Normal for age  2. NO CT finding of a mass, infarct or hemorrhage.   As read by radiology    CT head w contrast    Normal cerebral perfusion  As read by radiology    MR brain w/o and w contrast  Pending     Laboratory:  Troponin I: <0.015    PTT: 25  INR: 0.87    CBC: WNL (WBC 5.2, HGB 14.4, )  BMP: Calcium 8.4, WNL (Creatinine 0.96)    Interventions:  0545: Aspirin 325 mg PO    Emergency Department  Course:  Past medical records, nursing notes, and vitals reviewed.  0507: I performed an exam of the patient and obtained history, as documented above.    0509: Code stroke called.      0511: I spoke with Stroke Neurology, Dr. Rondon  0529: [facility] called regarding the patient's images  0536: [facility] called   0538: Dr. Rondon called back.    0554Findings and plan explained to the Patient who consents to admission.     0608: Discussed the patient with Dr. Vigil, who will admit the patient to a [facility] bed for further monitoring, evaluation, and treatment.       Impression & Plan      CMS Diagnoses: The patient has stroke symptoms:           ED Stroke specific documentation           NIHSS PDF          Protocol PDF     Patient last known well time: 8:30 PM (Woke up at 0045)  ED Provider first to bedside at: 5:07 AM  CT Results received at: 5:22 AM  Patient was not treated with TPA due to the following reason(s):  Out of the treatment time window    National Institutes of Health Stroke Scale (Baseline)  Time Performed: 5:07 AM      Score    Level of consciousness: (0)   Alert, keenly responsive    LOC questions: (0)   Answers both questions correctly    LOC commands: (0)   Performs both tasks correctly    Best gaze: (0)   Normal    Visual: (0)   No visual loss    Facial palsy: (0)   Normal symmetrical movements    Motor arm (left): (2)   Some effort against gravity    Motor arm (right): (0)   No drift    Motor leg (left): (0)   No drift    Motor leg (right): (0)   No drift    Limb ataxia: (1)   Present in one limb    Sensory: (1)   Mild to moderate sensory loss    Best language: (0)   Normal- no aphasia    Dysarthria: (0)   Normal    Extinction and inattention: (0)   No abnormality        Total Score:  4        Stroke Mimics were considered (including migraine headache, seizure disorder, hypoglycemia (or hyperglycemia), head or spinal trauma, CNS infection, Toxin ingestion and shock state (e.g.  sepsis) .      On reevaluation the patient's stroke scale was unchanged objectively, though he states he feels better.      Medical Decision Making:  Kevin is a 53 year old gentleman who presents with wake up stoke symptoms. He is outside of the TPA window base on the timing of his presentation. His symptoms are also improving non recheck. He has marked strength deficit in his left upper extremity with mild objective numbness in that extremity too. His non contrasted CT perfusion and CTA of the head and neck was essentially unremarkable by preliminary read. There may be a subtle deficit in the perfusion study per the Neurologist read. He has received an Aspirin and will be admitted for MRI and further stroke workup including echocardiogram. Patient is comfortable with this exam. He will be allowed to remain permissively hypertensive and is admitted in stable condition.     Diagnosis:    ICD-10-CM    1. Suspected Acute CVA (cerebrovascular accident)  I63.9    2. Weakness of left upper extremity R29.898        Disposition:  Admitted to bob Yepez  1/24/2019   [facility] EMERGENCY DEPARTMENT    Scribe Disclosure:  Javier FAIRCHILD, am serving as a scribe at 5:07 AM on 1/24/2019 to document services personally performed by Mirza Pop MD based on my observations and the provider's statements to me.        Mirza Pop MD  01/24/19 0719

## 2019-01-24 NOTE — PLAN OF CARE
PRIMARY DIAGNOSIS: Numbness/Tingling LUE, Stroke  OUTPATIENT/OBSERVATION GOALS TO BE MET BEFORE DISCHARGE:  ADLs back to baseline: Yes    Activity and level of assistance: Ambulating independently.    Pain status: Pain free.    Return to near baseline physical activity: Yes     Discharge Planner Nurse   Safe discharge environment identified: Yes  Barriers to discharge: No       Entered by: Eloina Smith 01/24/2019 12:36 PM     Please review provider order for any additional goals.   Nurse to notify provider when observation goals have been met and patient is ready for discharge.    Patient alert and oriented  Peripheral IV saline locked  Initial Tele-Stoke Consult complete  No numbness/tingling/weakness in LUE  Plan for echo  Continue with plan of care

## 2019-01-24 NOTE — LETTER
January 28, 2019      Milad Brown  [address] [facility] [address]        To Whom It May Concern:    Milad Brown  was admitted under observation on 01/24/2019 and discharged later on the same day. Patient may return to work without restrictions.        Sincerely,            Gui Boland MD.

## 2019-01-24 NOTE — CONSULTS
"[facility]      Stroke Consult Note    Reason for Consult:  Non-emergent consult request for \"CVA\"    HPI  Milad Brown is a 53 year old left-handed man who presents to the hospital after some neurologic problems which began this morning. He noted that at 1 am, he had numbness, tingling and weakness of the left arm and left hand, and then he went back to sleep and noticed it was still there when he woke up. He was dropping things with his left hand because he couldn't feel them. His arm felt strong and he could move it antigravity without a problem, so any weakness was probably distal and minor. He got to work and his boss recommended he come to the ED. He feels 100% normal at the time of my eval (11:30 am). He thinks his symptoms lasted in total about 4 hours. When he got to the ED BP was 176/118, since then 140-160 range. He has no medical problems (he does have a doctor) and takes no home meds. No prior neurologic history.    TPA Treatment   Not given due to minor / isolated / quickly resolving stroke symptoms.    Endovascular Treatment  Not initiated due to absence of proximal vessel occlusion    TIA Evaluation Summarized  MRI and/or Head CT: MRI brain shows no acute infarct. Some WM hyperintensities, nonspecific  Intracranial Vascular Imaging: CTA head no stenosis or aneurysm  Cervical Carotid and Vertebral Artery Vascular Imaging: CTA neck mild atherosclerotic disease involving the carotid siphons b/l without stenosis  Echocardiogram: not yet done  EKG/Telemetry: SR  LDL: 90  A1c: 6.3  Other testing: Not Applicable    ABCD2 Patients Score   Age ? 60 years 1 point 0   Blood Pressure     -SBP ? 140 or DBP ?  90    1 point 1   Clinical Features    -Unilateral weakness   -Speech disturbance w/o           weakness    -Other    2 points  1 point    0 points 0   Duration of symptoms    ?60 minutes    10-59 minutes    <10 minutes   2 points  1 point  0 points 2   Diabetes  1 point 0   Patient s " ABCD2 Score (0-7) = 3       Impression  Transient Ischemic Attack    Recommendations  Transient Ischemic Attack Recommendations  - ABCD2 Score: 3  - Statin: will lower dose from 40mg lipitor to 10mg given his relatively low LDL, goal is   - TTE with Bubble Study  - Strongly consider DAPT for 3 weeks but will await echo before making final rec's    Patient Follow-up    - in the next 1 week(s) with PCP  - in 4-6 weeks with local neurologist    Please contact the Stroke Service with any questions. Will make final recs after echo.    Cora Price PA-C  Neurology  2019 11:49 AM  Pager: [phone]    Text Page ([phone])  __________________________________________________    History reviewed. No pertinent past medical history.    Past Surgical History:   Procedure Laterality Date     C NONSPECIFIC PROCEDURE      Right thumb chip fracture -- Abstracted 02     COLONOSCOPY N/A 2015    Procedure: COLONOSCOPY;  Surgeon: Bob Andrade MD;  Location:  GI       Medications   Home meds: none      aspirin  325 mg Oral Daily     atorvastatin  40 mg Oral QPM     [START ON 2019] influenza vaccine adult (product based on age)  0.5 mL Intramuscular Prior to discharge         acetaminophen, acetaminophen, melatonin, naloxone, ondansetron **OR** ondansetron, polyethylene glycol    Allergies   No Known Allergies    Family History   Family History     Problem (# of Occurrences) Relation (Name,Age of Onset)    Cancer (1) Father: prostate cancer, age 82    Diabetes (3) Mother, Brother, Sister    Hypertension (1) Brother          Social History   Social History     Tobacco Use     Smoking status: Never Smoker     Smokeless tobacco: Never Used   Substance Use Topics     Alcohol use: No     Drug use: No         ROS  The 10 point Review of Systems is negative other than noted in the HPI or here.     PHYSICAL EXAMINATION  Temp:  [97.8  F (36.6  C)] 97.8  F (36.6  C)  Pulse:  [54-68] 56  Heart Rate:   [53-67] 54  Resp:  [8-39] 16  BP: (136-176)/() 145/90  SpO2:  [95 %-100 %] 99 %    Neurologic  Mental Status:  fully alert, attentive and oriented, follows commands, speech clear and fluent  Cranial Nerves:  visual fields intact, EOMI with normal smooth pursuit, facial sensation intact and symmetric, facial movements symmetric, hearing not formally tested but intact to conversation, no dysarthria, shoulder shrug strong bilaterally, tongue protrusion midline  Motor:  no pronator drift, normal and symmetric rapid finger tapping, able to move all limbs spontaneously, strength 5/5 throughout upper and lower extremities  Reflexes:  unable to test (telestroke)  Sensory:  intact to LT, no sensory neglect  Coordination:  FNF and HS intact without dysmetria  Station/Gait:  unable to test (telestroke)    Stroke Scales    NIHSS  Interval and Comments     1a. Level of Consciousness 0-->Alert, keenly responsive   1b. LOC Questions 0-->Answers both questions correctly   1c. LOC Commands 0-->Performs both tasks correctly   2.   Best Gaze 0-->Normal   3.   Visual 0-->No visual loss   4.   Facial Palsy 0-->Normal symmetrical movements   5a. Motor Arm, Left 0-->No drift, limb holds 90 (or 45) degrees for full 10 secs   5b. Motor Arm, Right 0-->No drift, limb holds 90 (or 45) degrees for full 10 secs   6a. Motor Leg, Left 0-->No drift, leg holds 30 degree position for full 5 secs   6b. Motor Leg, right 0-->No drift, leg holds 30 degree position for full 5 secs   7.   Limb Ataxia 0-->Absent   8.   Sensory 0-->Normal, no sensory loss   9.   Best Language 0-->No aphasia, normal   10. Dysarthria 0-->Normal   11. Extinction and Inattention  0-->No abnormality   Total 0       Labs/Imaging  Labs and imaging were reviewed and used in developing plan; pertinent results included.  Data   CBC  WBC (10e9/L)   Date Value   01/24/2019 5.2   07/12/2017 4.6   12/29/2015 4.7    RBC Count (10e12/L)   Date Value   01/24/2019 5.25   07/12/2017 4.73    12/29/2015 4.89    Hemoglobin (g/dL)   Date Value   01/24/2019 14.4   07/12/2017 13.2 (L)   12/29/2015 13.7      Hematocrit (%)   Date Value   01/24/2019 46.6   07/12/2017 40.7   12/29/2015 42.3    Platelet Count (10e9/L)   Date Value   01/24/2019 209   07/12/2017 172   12/29/2015 181         BMP  Sodium (mmol/L)   Date Value   01/24/2019 140   07/12/2017 145 (H)   08/10/2016 142    Potassium (mmol/L)   Date Value   01/24/2019 3.9   07/12/2017 4.3   08/10/2016 4.0    Chloride (mmol/L)   Date Value   01/24/2019 108   07/12/2017 111 (H)   08/10/2016 109      Carbon Dioxide (mmol/L)   Date Value   01/24/2019 25   07/12/2017 28   08/10/2016 25    Glucose (mg/dL)   Date Value   01/24/2019 85   07/12/2017 82   08/10/2016 95    Urea Nitrogen (mg/dL)   Date Value   01/24/2019 18   07/12/2017 11   08/10/2016 14      Creatinine (mg/dL)   Date Value   01/24/2019 0.96   07/12/2017 1.00   08/10/2016 1.03    Calcium (mg/dL)   Date Value   01/24/2019 8.4 (L)   07/12/2017 8.8   08/10/2016 9.2         INR Troponin I A1C   INR (no units)   Date Value   01/24/2019 0.87    Troponin I ES (ug/L)   Date Value   01/24/2019 <0.015   02/27/2015     <0.015  The 99th percentile for upper reference range is 0.045 ug/L.  Troponin values in   the range of 0.045 - 0.120 ug/L may be associated with risks of adverse   clinical events.   Effective 7/30/2014, the reference range for this assay has changed to reflect   new instrumentation/methodology.      No results found for: A1C     Liver Panel  Protein Total (g/dL)   Date Value   07/12/2017 7.1   08/10/2016 7.2   12/29/2015 7.2    Albumin (g/dL)   Date Value   07/12/2017 3.9   08/10/2016 4.0   12/29/2015 3.9    Bilirubin Total (mg/dL)   Date Value   07/12/2017 0.3   08/10/2016 0.3   12/29/2015 0.4      Alkaline Phosphatase (U/L)   Date Value   07/12/2017 77   08/10/2016 79   12/29/2015 75    AST (U/L)   Date Value   07/12/2017 29   08/10/2016 24   12/29/2015 55 (H)    ALT (U/L)   Date Value    07/12/2017 75 (H)   08/10/2016 60   12/29/2015 71 (H)      No results found for: BILIDIRECT       Lipid Profile  Cholesterol (mg/dL)   Date Value   01/24/2019 178   07/12/2017 183   12/29/2015 181    HDL Cholesterol (mg/dL)   Date Value   01/24/2019 77   07/12/2017 74   12/29/2015 73    LDL Cholesterol Calculated (mg/dL)   Date Value   01/24/2019 90   07/12/2017 85   12/29/2015 97      Triglycerides (mg/dL)   Date Value   01/24/2019 56   07/12/2017 120   12/29/2015 54    Cholesterol/HDL Ratio (no units)   Date Value   10/18/2014 2.4   11/16/2011 2.6   11/09/2010 2.7              I have personally spent a total of 50 minutes providing care and consulting with this patient's medical providers today, with more than 50% of this time spent in consultation, coordination of care, and discussion with the patient and/or family regarding diagnostic results, prognosis, symptom management, risks and benefits of management options, and development of plan of care.     Telestroke Service Details  (for non-emergent stroke consult with tele)  Tele service began 01/24/19   1120   Tele service ended 01/24/19   1145   Type of service telemedicine diagnostic assessment of acute neurological changes   Reason telemedicine is appropriate patient requires assessment with a specialist for diagnosis and treatment of neurological symptoms   Mode of transmission secure interactive audio and video communication per [employer]   Originating site (patient location) [facility]    Distant site (provider location) [facility]

## 2019-01-24 NOTE — PLAN OF CARE
Dr Boland paged regarding Med Rec. He verbalized to go ahead and discharge patient to home. He has done everything. New med education and discharge instructions reviewed with patient who verbalized understanding. Med education complete.

## 2019-01-24 NOTE — DISCHARGE SUMMARY
[facility]  Hospitalist Discharge Summary       Date of Admission:  1/24/2019  Date of Discharge:  1/24/2019  Discharging Provider: Gui Boland MD    Discharge Diagnoses   TIA  HTN    Follow-ups Needed After Discharge   Follow-up Appointments     Follow-up and recommended labs and tests       Follow up with primary care provider, Kay Garnica, within 7 days for hospital follow- up.  Follow up with Neurologist in 4-6 weeks.             Patient will be discharged on event monitor, if ECHO is normal    Unresulted Labs Ordered in the Past 30 Days of this Admission     No orders found from 11/25/2018 to 1/25/2019.        Hospital Course    Please see H&P done earlier this morning.  Patient has NO symptoms and evaluated by Neurology. Echo is pending, will call patient tomorrow if there is any finding on the ECHO official report.    Consultations This Hospital Stay   NEUROLOGY IP CONSULT  OCCUPATIONAL THERAPY ADULT IP CONSULT    Code Status   Full Code    Time Spent on this Encounter   I, Gui Boland, personally saw the patient today and spent less than or equal to 30 minutes discharging this patient.       Gui Boland MD  [facility]  ______________________________________________________________________    Physical Exam   Vital Signs: Temp: 98.4  F (36.9  C) Temp src: Oral BP: 131/88 Pulse: 65 Heart Rate: 54 Resp: 16 SpO2: 97 % O2 Device: None (Room air)    Weight: 230 lbs 7 oz  General Appearance: Pink, un icteric, moist mucosa.  Respiratory: Good air entry bilateral, no wheezing or crackles.  Cardiovascular: S1 and S2 well heard, no murmur or gallop.  GI: Pink, un icteric, moist mucosa.  Skin: No rash  Other: Neuro- no focal deficit, CN II-XII grossly normal.        Primary Care Physician   Kay Garnica    Discharge Disposition   Discharged to home  Condition at discharge: Stable    Significant Results and Procedures   Most Recent 3 CBC's:  Recent  Labs   Lab Test 01/24/19  0502 07/12/17  1621 12/29/15  0916   WBC 5.2 4.6 4.7   HGB 14.4 13.2* 13.7   MCV 89 86 87    172 181     Most Recent 3 BMP's:  Recent Labs   Lab Test 01/24/19  0502 07/12/17  1621 08/10/16  1615    145* 142   POTASSIUM 3.9 4.3 4.0   CHLORIDE 108 111* 109   CO2 25 28 25   BUN 18 11 14   CR 0.96 1.00 1.03   ANIONGAP 7 6 8   MARIAON 8.4* 8.8 9.2   GLC 85 82 95   ,   Results for orders placed or performed during the hospital encounter of 01/24/19   CT Head w/o Contrast    Narrative    CT SCAN OF THE HEAD WITHOUT CONTRAST   1/24/2019 5:31 AM     HISTORY: See the clinical information for interpreting provider. Left  upper extremity weakness, numbness.    TECHNIQUE:  Axial images of the head and coronal reformations without  IV contrast material.  Radiation dose for this scan was reduced using  automated exposure control, adjustment of the mA and/or kV according  to patient size, or iterative reconstruction technique.    COMPARISON: None.    FINDINGS:  The ventricles are normal in size, shape and configuration.   The brain parenchyma and subarachnoid spaces are normal. There is no  evidence of intracranial hemorrhage, mass, acute infarct or anomaly.     The visualized portions of the sinuses and mastoids appear normal.  There is no evidence of trauma.       Impression    IMPRESSION:  Normal CT scan of the head.  Preliminary report was  called by Dr. Mullins.    MARTIR KELSEY MD   CT Head Neck Angio w/o & w Contrast    Narrative    CTA HEAD NECK WITH CONTRAST  1/24/2019 5:32 AM     HISTORY:  See the clinical information for interpreting provider. Left  upper extremity weakness, numbness.    TECHNIQUE: Axial images were obtained through the head and neck  without and with intravenous contrast. 70 mL of Isovue-370 was given.  Multiplanar reconstructions were performed. 3-D reconstructions off a  remote workstation were acquired for CT angiography. Carotid stenoses  were evaluated by  comparing the caliber of the proximal internal  carotid artery to the caliber of the distal internal carotid artery.  Radiation dose for this scan was reduced using automated exposure  control, adjustment of the mA and/or kV according to patient size, or  iterative reconstruction technique.    FINDINGS:  Brachiocephalic vessels: Normal.    Brachial carotid system: Normal.    Left carotid system: Normal.    Right vertebral artery: Normal.    Left vertebral artery: Normal.    [place]: There is some calcified plaque in the carotid siphon  regions bilaterally without any stenosis. The basilar artery is  patent. The proximal anterior, middle, and posterior cerebral arteries  are patent. There is no evidence for aneurysm, thromboembolism,  stenosis, or dissection.      Impression    IMPRESSION:  1. Mild atherosclerotic disease involving the carotid siphon regions  bilaterally without stenosis.  2. No evidence for significant stenosis, dissection, aneurysm, or  thromboembolism.  3. Preliminary report was given by Dr. Mullins.    MARTIR KELSEY MD   CT Head Perfusion w Contrast    Narrative    CT HEAD PERFUSION WITH CONTRAST 1/24/2019 5:34 AM     HISTORY: Left upper extremity weakness and numbness.    TECHNIQUE: Axial images were obtained through the brain with  intravenous contrast for CT perfusion. 50 mL of Isovue-370 was given.  Radiation dose for this scan was reduced using automated exposure  control, adjustment of the mA and/or kV according to patient size, or  iterative reconstruction technique.    FINDINGS: Cerebral blood flow, cerebral blood volume, and mean transit  time maps are symmetric. There is no evidence for ischemia or infarct.      Impression    IMPRESSION: Normal CT brain perfusion imaging. Preliminary report was  given by Dr. Mullins.    MARTIR KELSEY MD   MR Brain w/o & w Contrast    Narrative    MRI BRAIN WITHOUT AND WITH CONTRAST  1/24/2019 7:41 AM    HISTORY:  See the clinical information  for interpreting provider. Left  upper extremity weakness.     TECHNIQUE:  Multiplanar, multisequence MRI of the brain without and  with 10 mL Gadavist.    COMPARISON: Diffusion-weighted images are normal. There is no evidence  for intracranial hemorrhage or acute infarct. There are some patchy  white matter changes in both hemispheres without mass effect or  enhancement. Brain parenchyma is otherwise normal. Ventricles and  subarachnoid spaces are normal. Vascular structures are patent at the  skull base.      Impression    IMPRESSION:  1. Scattered signal hyperintensities in the supratentorial white  matter. These are nonspecific and could be due to chronic small vessel  ischemic disease, chronic demyelination, or gliosis.  2. No evidence for intracranial hemorrhage, acute infarct, or any  focal mass lesions.    MARTIR KELSEY MD       Discharge Orders      Reason for your hospital stay    TIA     Follow-up and recommended labs and tests     Follow up with primary care provider, Kay Garnica, within 7 days for hospital follow- up.  Follow up with Neurologist in 4-6 weeks.     Activity    Your activity upon discharge: activity as tolerated     Full Code     Diet    Follow this diet upon discharge: Orders Placed This Encounter      Regular Diet Adult     Discharge Medications      Review of your medicines      START taking      Dose / Directions   aspirin 81 MG chewable tablet  Commonly known as:  ASA      Dose:  81 mg  Start taking on:  1/25/2019  Take 1 tablet (81 mg) by mouth daily  Quantity:  90 tablet  Refills:  3     atorvastatin 10 MG tablet  Commonly known as:  LIPITOR      Dose:  10 mg  Take 1 tablet (10 mg) by mouth every evening  Quantity:  30 tablet  Refills:  3     clopidogrel 75 MG tablet  Commonly known as:  PLAVIX      Dose:  75 mg  Take 1 tablet (75 mg) by mouth daily  Quantity:  21 tablet  Refills:  0           Where to get your medicines      These medications were sent to [facility]  [id number] - [place], [place] - [address] [place] [id number] [facility] [address]    Phone:  [phone]     aspirin 81 MG chewable tablet    atorvastatin 10 MG tablet    clopidogrel 75 MG tablet       CONSULTS- Neurologist.    Allergies   No Known Allergies

## 2019-01-25 ENCOUNTER — TELEPHONE (OUTPATIENT)
Dept: INTERNAL MEDICINE | Facility: CLINIC | Age: 54
End: 2019-01-25

## 2019-01-25 NOTE — TELEPHONE ENCOUNTER
IP F/U    Date: 01/24/19  Diagnosis: TIA, Acute Cva  Is patient active in care coordination? No  Was patient in TCU? No

## 2019-01-28 NOTE — PROGRESS NOTES
Received phone call from patient needs a return to work note.  Dr. Perez will do.    Patient's cell phone number [phone]    Dr. Perez did write note.  I faxed this to [phone], per Milad' recommendation.

## 2019-01-31 ENCOUNTER — OFFICE VISIT (OUTPATIENT)
Dept: INTERNAL MEDICINE | Facility: CLINIC | Age: 54
End: 2019-01-31
Payer: COMMERCIAL

## 2019-01-31 ENCOUNTER — HOSPITAL ENCOUNTER (OUTPATIENT)
Dept: CARDIOLOGY | Facility: CLINIC | Age: 54
Discharge: HOME | End: 2019-01-31
Attending: INTERNAL MEDICINE | Admitting: INTERNAL MEDICINE
Payer: COMMERCIAL

## 2019-01-31 ENCOUNTER — TELEPHONE (OUTPATIENT)
Dept: INTERNAL MEDICINE | Facility: CLINIC | Age: 54
End: 2019-01-31

## 2019-01-31 VITALS
DIASTOLIC BLOOD PRESSURE: 80 MMHG | OXYGEN SATURATION: 99 % | HEIGHT: 77 IN | WEIGHT: 239.9 LBS | BODY MASS INDEX: 28.33 KG/M2 | SYSTOLIC BLOOD PRESSURE: 160 MMHG | RESPIRATION RATE: 17 BRPM | HEART RATE: 82 BPM | TEMPERATURE: 97.9 F

## 2019-01-31 DIAGNOSIS — G45.9 TIA (TRANSIENT ISCHEMIC ATTACK): ICD-10-CM

## 2019-01-31 DIAGNOSIS — G45.9 TIA (TRANSIENT ISCHEMIC ATTACK): Primary | ICD-10-CM

## 2019-01-31 PROCEDURE — 99214 OFFICE O/P EST MOD 30 MIN: CPT | Performed by: INTERNAL MEDICINE

## 2019-01-31 PROCEDURE — 93272 ECG/REVIEW INTERPRET ONLY: CPT | Performed by: INTERNAL MEDICINE

## 2019-01-31 PROCEDURE — 93270 REMOTE 30 DAY ECG REV/REPORT: CPT

## 2019-01-31 ASSESSMENT — MIFFLIN-ST. JEOR: SCORE: 2040.56

## 2019-01-31 NOTE — TELEPHONE ENCOUNTER
Judith from [facility] cardiopulmonary dept calling.  Dr. Perez (hospitalist) ordered an event monitor.  Did not specify how many days patient should wear the monitor.    Patient there now for placement of monitor.    Please advise how many days to wear it.  (Routed to colleague d/t primary care provider out of clinic today.)

## 2019-01-31 NOTE — PATIENT INSTRUCTIONS
Aspirin  plavix for 3 weeks  Statin    Get heart monitor    Follow up with neurology  If you haven't heard form us by Monday, call

## 2019-01-31 NOTE — PROGRESS NOTES
SUBJECTIVE:   Milad Brown is a 53 year old male who presents to clinic today for the following health issues:          Hospital Follow-up Visit:    Hospital/Nursing Home/IP Rehab Facility: [facility]  Date of Admission: 1/24/19  Date of Discharge: 1/24/19  Reason(s) for Admission: tingling in left arm, unable to hold anything     He had an episode of left arm weakness and numbness/tingling last 15 minutes on 1/24/19.    The patient had MRI of brain, which was negative for a CVA.  CTA revealed <50% carotid artery stenosis.  Patient was seen by neurology.  Aspirin and plavix and statin were started.  Lipids were normal. Hemoglobin A1c was 6.3%.     The patient has not had any episodes since.     Patient reports he has only been taking aspirin.  The patient also has not had the heart monitor set up yet.              Problems taking medications regularly:  None       Medication changes since discharge: None       Problems adhering to non-medication therapy:  None    Summary of hospitalization:  [facility] discharge summary reviewed  Diagnostic Tests/Treatments reviewed.  Follow up needed: neurology  Other Healthcare Providers Involved in Patient s Care:         None  Update since discharge: improved.     Post Discharge Medication Reconciliation: discharge medications reconciled, continue medications without change.  Plan of care communicated with patient     Coding guidelines for this visit:  Type of Medical   Decision Making Face-to-Face Visit       within 7 Days of discharge Face-to-Face Visit        within 14 days of discharge   Moderate Complexity [id number]   High Complexity [id number]              PROBLEMS TO ADD ON...    Problem list and histories reviewed & adjusted, as indicated.  Additional history: as documented    Current Outpatient Medications   Medication Sig Dispense Refill     aspirin (ASA) 81 MG chewable tablet Take 1 tablet (81 mg) by mouth daily 90 tablet 3      "atorvastatin (LIPITOR) 10 MG tablet Take 1 tablet (10 mg) by mouth every evening 30 tablet 3     clopidogrel (PLAVIX) 75 MG tablet Take 1 tablet (75 mg) by mouth daily 21 tablet 0       Reviewed and updated as needed this visit by clinical staff  Tobacco  Allergies  Meds  Med Hx  Surg Hx  Fam Hx  Soc Hx      Reviewed and updated as needed this visit by Provider         ROS:  CONSTITUTIONAL: NEGATIVE for fever, chills, change in weight  RESP: NEGATIVE for significant cough or SOB  CV: NEGATIVE for chest pain, palpitations or peripheral edema  Neuro: left arm numbness/tingling    OBJECTIVE:     /80   Pulse 82   Temp 97.9  F (36.6  C) (Oral)   Resp 17   Ht 1.956 m (6' 5\")   Wt 108.8 kg (239 lb 14.4 oz)   SpO2 99%   BMI 28.45 kg/m    Body mass index is 28.45 kg/m .  GENERAL: healthy, alert and no distress  RESP: lungs clear to auscultation - no rales, rhonchi or wheezes  CV: regular rate and rhythm, normal S1 S2, no S3 or S4, no murmur, click or rub  Neuro: CN II-XII intact; strength 5/5 of upper and lower extremities bilaterally      ASSESSMENT/PLAN:   (G45.9) TIA (transient ischemic attack)  (primary encounter diagnosis)  Comment:    Plan: NEUROLOGY ADULT REFERRAL   -pt to take aspirin, plavix, and statin, per neurology recommendations  -patient to have heart monitor set up  -f/u with neurology    -pt unable to work until okay by neurology      Kay Garnica MD  [facility]    "

## 2019-01-31 NOTE — PROGRESS NOTES
Event monitor placed. Primary MD will call and clarify how many days it should be worn. For now, placed for 30 days and they will call the patient if he can take it off earlier.

## 2019-02-01 ENCOUNTER — TELEPHONE (OUTPATIENT)
Dept: INTERNAL MEDICINE | Facility: CLINIC | Age: 54
End: 2019-02-01

## 2019-02-01 NOTE — TELEPHONE ENCOUNTER
Please let patient know that I would like him to call neurology and schedule an appointment for 4 weeks out, as he may need that time for the monitor results to return.

## 2019-02-04 NOTE — TELEPHONE ENCOUNTER
Pt calling to get referral info from Dr Garnica, please call Milad on his cell as soon as possible [phone].

## 2019-02-05 NOTE — TELEPHONE ENCOUNTER
Reason for Call:  Other call back    Detailed comments: pt. Would like referral to neurologist     Phone Number Patient can be reached at: Cell number on file:    Telephone Information:   Mobile [phone]       Best Time: asap    Can we leave a detailed message on this number? YES    Call taken on 2/5/2019 at 7:47 AM by Mary Phipps

## 2019-02-06 ENCOUNTER — TELEPHONE (OUTPATIENT)
Dept: INTERNAL MEDICINE | Facility: CLINIC | Age: 54
End: 2019-02-06

## 2019-02-06 NOTE — TELEPHONE ENCOUNTER
Called pt, provided the below information.     Your provider has referred you for the following:   Consult at [facility]: [facility] - [place] [phone]   [email or web address]  TIA  [facility]: [facility] [phone]   [email or web address]  [facility]: [facility] [phone]   [email or web address]  Majo Navarro RN

## 2019-02-06 NOTE — TELEPHONE ENCOUNTER
Please call patient to give him the neurology phone numbers- referrals were ordered at his most recent appointment.

## 2019-02-06 NOTE — TELEPHONE ENCOUNTER
Form received from patient at the  for [facility] for review and signature.  Put in Dr. Garnica's in basket.

## 2019-02-08 NOTE — TELEPHONE ENCOUNTER
Reason for Call:  Other call back    Detailed comments: pt. Following up would like to know when FMLA papers will be signed    Phone Number Patient can be reached at: Cell number on file:    Telephone Information:   Mobile [phone]       Best Time: asap    Can we leave a detailed message on this number? YES    Call taken on 2/8/2019 at 1:23 PM by Mary Phipps

## 2019-02-13 NOTE — TELEPHONE ENCOUNTER
Milad's mom called this evening and the start date for his la paperwork is Jan 24. That is the day he was at the er. His nurology appt. Is Feb 27th.  Please call mom when these forms are done, they will . [phone]

## 2019-02-13 NOTE — TELEPHONE ENCOUNTER
Please find out what dates he needs.    Recommend not returning to work until okayed by neurology.    So would like start date off of work atleast.    thanks

## 2019-02-19 NOTE — TELEPHONE ENCOUNTER
Pt calls stating his Holter Monitor is beeping with Clinic phone # on it, per the Pt rep.     Informed her that he is over the 14 days and he needs to turn this in. This is probably why this is beeping.     She will let him know to turn this in.

## 2019-02-27 ENCOUNTER — TRANSFERRED RECORDS (OUTPATIENT)
Dept: HEALTH INFORMATION MANAGEMENT | Facility: CLINIC | Age: 54
End: 2019-02-27

## 2019-03-05 ENCOUNTER — TELEPHONE (OUTPATIENT)
Dept: INTERNAL MEDICINE | Facility: CLINIC | Age: 54
End: 2019-03-05

## 2019-03-05 DIAGNOSIS — G45.9 TIA (TRANSIENT ISCHEMIC ATTACK): Primary | ICD-10-CM

## 2019-03-05 RX ORDER — ROSUVASTATIN CALCIUM 10 MG/1
10 TABLET, COATED ORAL DAILY
Qty: 90 TABLET | Refills: 0 | Status: SHIPPED | OUTPATIENT
Start: 2019-03-05 | End: 2019-05-07

## 2019-03-05 NOTE — TELEPHONE ENCOUNTER
"Writer called regarding message below. Patient states that after starting Lipitor, he was experiencing \"muscle tightness\". Due to this, patient states he stopped taking it about two weeks ago. Patient wondering if primary care provider could prescribe or recommend a different medication. Please advise.      "

## 2019-03-05 NOTE — TELEPHONE ENCOUNTER
Reason for Call:  Other call back    Detailed comments: would like dr butts to call so that they can discuss the lipitor that she rxed.  Pt would like to try something more natural.      Phone Number Patient can be reached at: [phone]    Best Time: asap    Can we leave a detailed message on this number? YES, but would like to discuss meds     Call taken on 3/5/2019 at 8:29 AM by Mary Phipps

## 2019-03-06 ENCOUNTER — TELEPHONE (OUTPATIENT)
Dept: INTERNAL MEDICINE | Facility: CLINIC | Age: 54
End: 2019-03-06

## 2019-03-06 NOTE — TELEPHONE ENCOUNTER
Called patient, per below left apt a detailed message advising Crestor sent to pharmacy and when to have labs checked. Majo Navarro RN

## 2019-03-06 NOTE — TELEPHONE ENCOUNTER
Please call patient and have him schedule an appt   At home he has been getting bps of 120's/80's.     Call patient and schedule an appt on Friday- 10:20am would be okay.    Nurse only and then we can turn into appt with me    If no nurse only appt, schedule just with me.

## 2019-03-06 NOTE — TELEPHONE ENCOUNTER
Patient calls. He received voice message earlier today advising him prescription was sent for Crestor.     Patient also concerned about his BP. States he was taking a BP medication that he had to stop because it caused fatigue and his muscles to feel tight. Patient states that symptoms improved after he stopped the medication, but is concerned because his BP has been slightly high. Per patient, SBP is in the 140s. Patient is requesting prescription for BP that would not cause side effects. This RN advised patient that all medications have potential side effects associated with them. Patient states he understands this, but asks if there is a medication that would not cause these specific side effects.     BP Readings from Last 6 Encounters:   01/31/19 160/80   01/24/19 (!) 154/97   10/28/18 138/88   07/25/18 130/84   02/20/18 124/72   07/12/17 118/70       This RN cannot find any history of patient being on any BP medication, but there is record of patient stopping Atorvastatin due to muscle tightness Patient does not know name of medication, but repeats that he was on BP medication before. Will route to provider to review for recommendations or if patient needs appointment to discuss.     Patient also asks about follow-up appointment with Dr. Garnica - he will be having labs rechecked in 3 months.

## 2019-03-08 ENCOUNTER — OFFICE VISIT (OUTPATIENT)
Dept: INTERNAL MEDICINE | Facility: CLINIC | Age: 54
End: 2019-03-08
Payer: COMMERCIAL

## 2019-03-08 VITALS
DIASTOLIC BLOOD PRESSURE: 90 MMHG | BODY MASS INDEX: 28.09 KG/M2 | RESPIRATION RATE: 20 BRPM | WEIGHT: 237.9 LBS | HEIGHT: 77 IN | OXYGEN SATURATION: 100 % | HEART RATE: 75 BPM | SYSTOLIC BLOOD PRESSURE: 140 MMHG

## 2019-03-08 DIAGNOSIS — I10 ESSENTIAL HYPERTENSION: Primary | ICD-10-CM

## 2019-03-08 DIAGNOSIS — R73.03 PRE-DIABETES: ICD-10-CM

## 2019-03-08 PROCEDURE — 99214 OFFICE O/P EST MOD 30 MIN: CPT | Performed by: INTERNAL MEDICINE

## 2019-03-08 RX ORDER — HYDROCHLOROTHIAZIDE 12.5 MG/1
25 TABLET ORAL DAILY
Qty: 90 TABLET | Refills: 0 | Status: SHIPPED | OUTPATIENT
Start: 2019-03-08 | End: 2019-05-07

## 2019-03-08 ASSESSMENT — MIFFLIN-ST. JEOR: SCORE: 2031.49

## 2019-03-08 ASSESSMENT — PAIN SCALES - GENERAL: PAINLEVEL: NO PAIN (0)

## 2019-03-08 NOTE — PROGRESS NOTES
"  SUBJECTIVE:   Milad Brown is a 53 year old male who presents to clinic today for the following health issues:      Hypertension Follow-up      Outpatient blood pressures are not being checked.    Low Salt Diet: low salt    TIA.  He is following with neurology.  He is taking aspirin and cholesterol medication.    Pre-diabetes.  He reports diabetes runs in his family.  Last hemoglobin A1c was 6.3%.  He is trying to change his diet.  He exercises regularly.        Amount of exercise or physical activity: 4-5 days/week for an average of 45-60 minutes    Problems taking medications regularly: No    Medication side effects:muscle aches    Diet: low salt          Problem list and histories reviewed & adjusted, as indicated.      Reviewed and updated as needed this visit by clinical staff  Tobacco  Allergies  Meds       Reviewed and updated as needed this visit by Provider         ROS:  CONSTITUTIONAL: NEGATIVE for fever, chills, change in weight  RESP: NEGATIVE for significant cough or SOB  CV: NEGATIVE for chest pain, palpitations or peripheral edema    OBJECTIVE:     /90 (BP Location: Left arm, Patient Position: Sitting, Cuff Size: Adult Large)   Pulse 75   Resp 20   Ht 1.956 m (6' 5\")   Wt 107.9 kg (237 lb 14.4 oz)   SpO2 100%   BMI 28.21 kg/m    Body mass index is 28.21 kg/m .  GENERAL: healthy, alert and no distress  RESP: lungs clear to auscultation - no rales, rhonchi or wheezes  CV: regular rate and rhythm, normal S1 S2, no S3 or S4, no murmur, click or rub,         ASSESSMENT/PLAN:         (I10) Essential hypertension  (primary encounter diagnosis)  Comment: not at goal  Plan: hydrochlorothiazide (HYDRODIURIL) 12.5 MG         Tablet  Start medication        -recheck bp and labs in 2 weeks    (R73.03) Pre-diabetes  Comment:   Plan: NUTRITION REFERRAL                Kay Garnica MD  [facility]  "

## 2019-03-11 ENCOUNTER — TRANSFERRED RECORDS (OUTPATIENT)
Dept: HEALTH INFORMATION MANAGEMENT | Facility: CLINIC | Age: 54
End: 2019-03-11

## 2019-05-07 ENCOUNTER — OFFICE VISIT (OUTPATIENT)
Dept: INTERNAL MEDICINE | Facility: CLINIC | Age: 54
End: 2019-05-07
Payer: COMMERCIAL

## 2019-05-07 VITALS
HEART RATE: 95 BPM | BODY MASS INDEX: 26.92 KG/M2 | SYSTOLIC BLOOD PRESSURE: 120 MMHG | RESPIRATION RATE: 12 BRPM | HEIGHT: 77 IN | WEIGHT: 228 LBS | TEMPERATURE: 98 F | OXYGEN SATURATION: 98 % | DIASTOLIC BLOOD PRESSURE: 70 MMHG

## 2019-05-07 DIAGNOSIS — I10 ESSENTIAL HYPERTENSION: ICD-10-CM

## 2019-05-07 DIAGNOSIS — G45.9 TIA (TRANSIENT ISCHEMIC ATTACK): Primary | ICD-10-CM

## 2019-05-07 DIAGNOSIS — E55.9 VITAMIN D DEFICIENCY: ICD-10-CM

## 2019-05-07 PROCEDURE — 99214 OFFICE O/P EST MOD 30 MIN: CPT | Performed by: INTERNAL MEDICINE

## 2019-05-07 RX ORDER — HYDROCHLOROTHIAZIDE 12.5 MG/1
25 TABLET ORAL DAILY
Qty: 90 TABLET | Refills: 4 | Status: SHIPPED | OUTPATIENT
Start: 2019-05-07 | End: 2020-02-27

## 2019-05-07 RX ORDER — ROSUVASTATIN CALCIUM 10 MG/1
10 TABLET, COATED ORAL DAILY
Qty: 90 TABLET | Refills: 4 | Status: SHIPPED | OUTPATIENT
Start: 2019-05-07 | End: 2019-10-23

## 2019-05-07 ASSESSMENT — MIFFLIN-ST. JEOR: SCORE: 1986.58

## 2019-05-07 NOTE — PROGRESS NOTES
"  SUBJECTIVE:   Milad Brown is a 53 year old male who presents to clinic today for the following   health issues:    TIA.  The patient had had weakness on right side, which has resolved. Denies any new neurological symptoms.   The patient is on an aspirin and statin.  He was told that he is okay to return to work by neurology, however, the [employer] recommends 1 year before return to work.         LEBRON.  Patient is wearing a CPAP.  Patient is sleeping well.     Hypertension Follow-up      Outpatient blood pressures are being checked at home.  Results are normal.    Low Salt Diet: low salt      Amount of exercise or physical activity: 6-7 days/week for an average of 45-60 minutes    Problems taking medications regularly: No    Medication side effects: none    Diet: regular (no restrictions)          Reviewed  and updated as needed this visit by clinical staff  Tobacco  Allergies  Meds  Med Hx  Surg Hx  Fam Hx  Soc Hx        Reviewed and updated as needed this visit by Provider         Labs reviewed in EPIC    ROS:  CONSTITUTIONAL: NEGATIVE for fever, chills, change in weight  RESP: NEGATIVE for significant cough or SOB  CV: NEGATIVE for chest pain, palpitations or peripheral edema    OBJECTIVE:     /70   Pulse 95   Temp 98  F (36.7  C) (Oral)   Resp 12   Ht 1.956 m (6' 5\")   Wt 103.4 kg (228 lb)   SpO2 98%   BMI 27.04 kg/m    Body mass index is 27.04 kg/m .  GENERAL: healthy, alert and no distress  RESP: lungs clear to auscultation - no rales, rhonchi or wheezes  CV: regular rate and rhythm, normal S1 S2, no S3 or S4, no murmur, click or rub      ASSESSMENT/PLAN:       (G45.9) TIA (transient ischemic attack)  (primary encounter diagnosis)  Comment: no recurrent symptoms  Plan: rosuvastatin (CRESTOR) 10 MG tablet, Lipid         panel reflex to direct LDL Fasting,         Comprehensive metabolic panel        Continue on aspirin and statin    (I10) Essential hypertension  Comment: at goal  Plan: " hydrochlorothiazide (HYDRODIURIL) 12.5 MG         tablet, Comprehensive metabolic panel            (E55.9) Vitamin D deficiency  Comment: assess  Plan: Vitamin D Deficiency                Kay Garnica MD  [facility]

## 2019-05-07 NOTE — NURSING NOTE
"/70   Pulse 95   Temp 98  F (36.7  C) (Oral)   Resp 12   Ht 1.956 m (6' 5\")   Wt 103.4 kg (228 lb)   SpO2 98%   BMI 27.04 kg/m      "

## 2019-05-10 DIAGNOSIS — G45.9 TIA (TRANSIENT ISCHEMIC ATTACK): ICD-10-CM

## 2019-05-10 DIAGNOSIS — E55.9 VITAMIN D DEFICIENCY: ICD-10-CM

## 2019-05-10 DIAGNOSIS — I10 ESSENTIAL HYPERTENSION: ICD-10-CM

## 2019-05-10 LAB
ALBUMIN SERPL-MCNC: 3.9 G/DL (ref 3.4–5)
ALP SERPL-CCNC: 70 U/L (ref 40–150)
ALT SERPL W P-5'-P-CCNC: 50 U/L (ref 0–70)
ANION GAP SERPL CALCULATED.3IONS-SCNC: 4 MMOL/L (ref 3–14)
AST SERPL W P-5'-P-CCNC: 23 U/L (ref 0–45)
BILIRUB SERPL-MCNC: 0.4 MG/DL (ref 0.2–1.3)
BUN SERPL-MCNC: 15 MG/DL (ref 7–30)
CALCIUM SERPL-MCNC: 9.3 MG/DL (ref 8.5–10.1)
CHLORIDE SERPL-SCNC: 108 MMOL/L (ref 94–109)
CHOLEST SERPL-MCNC: 171 MG/DL
CO2 SERPL-SCNC: 29 MMOL/L (ref 20–32)
CREAT SERPL-MCNC: 0.96 MG/DL (ref 0.66–1.25)
GFR SERPL CREATININE-BSD FRML MDRD: 88 ML/MIN/{1.73_M2}
GLUCOSE SERPL-MCNC: 100 MG/DL (ref 70–99)
HDLC SERPL-MCNC: 74 MG/DL
LDLC SERPL CALC-MCNC: 80 MG/DL
NONHDLC SERPL-MCNC: 97 MG/DL
POTASSIUM SERPL-SCNC: 4.1 MMOL/L (ref 3.4–5.3)
PROT SERPL-MCNC: 7.3 G/DL (ref 6.8–8.8)
SODIUM SERPL-SCNC: 141 MMOL/L (ref 133–144)
TRIGL SERPL-MCNC: 85 MG/DL

## 2019-05-10 PROCEDURE — 80053 COMPREHEN METABOLIC PANEL: CPT | Performed by: INTERNAL MEDICINE

## 2019-05-10 PROCEDURE — 80061 LIPID PANEL: CPT | Performed by: INTERNAL MEDICINE

## 2019-05-10 PROCEDURE — 82306 VITAMIN D 25 HYDROXY: CPT | Performed by: INTERNAL MEDICINE

## 2019-05-10 PROCEDURE — 36415 COLL VENOUS BLD VENIPUNCTURE: CPT | Performed by: INTERNAL MEDICINE

## 2019-05-11 LAB — DEPRECATED CALCIDIOL+CALCIFEROL SERPL-MC: 30 UG/L (ref 20–75)

## 2019-05-28 ENCOUNTER — TRANSFERRED RECORDS (OUTPATIENT)
Dept: HEALTH INFORMATION MANAGEMENT | Facility: CLINIC | Age: 54
End: 2019-05-28

## 2019-08-01 ENCOUNTER — TELEPHONE (OUTPATIENT)
Dept: INTERNAL MEDICINE | Facility: CLINIC | Age: 54
End: 2019-08-01

## 2019-09-27 ENCOUNTER — OFFICE VISIT (OUTPATIENT)
Dept: INTERNAL MEDICINE | Facility: CLINIC | Age: 54
End: 2019-09-27
Payer: COMMERCIAL

## 2019-09-27 VITALS
RESPIRATION RATE: 12 BRPM | SYSTOLIC BLOOD PRESSURE: 118 MMHG | BODY MASS INDEX: 28.22 KG/M2 | OXYGEN SATURATION: 97 % | DIASTOLIC BLOOD PRESSURE: 72 MMHG | TEMPERATURE: 98.2 F | HEIGHT: 77 IN | WEIGHT: 239 LBS | HEART RATE: 67 BPM

## 2019-09-27 DIAGNOSIS — R22.2 LUMP IN CHEST: Primary | ICD-10-CM

## 2019-09-27 PROCEDURE — 99213 OFFICE O/P EST LOW 20 MIN: CPT | Performed by: INTERNAL MEDICINE

## 2019-09-27 ASSESSMENT — MIFFLIN-ST. JEOR: SCORE: 2031.48

## 2019-09-27 NOTE — PROGRESS NOTES
Subjective     Milad Brown is a 54 year old male who presents to clinic today for the following health issues:    HPI   Hypertension Follow-up      Do you check your blood pressure regularly outside of the clinic? yes     Are you following a low salt diet? Yes    Are your blood pressures ever more than 140 on the top number (systolic) OR more   than 90 on the bottom number (diastolic), for example 140/90? No        Pt is taking hydrochlorothiazide 12.5 mg. He checks his blood pressure outside of clinic and is exercising regularly.     Lump in the chest   Pt reports having a lump in the middle of his chest. This has been there a week ago. Denies any pain.     Other concerns...  -He did a sleep study and wears C-PAP machine. Notes that he feels well rested.   -Pt has discontinued taking Crestor 10 mg secondary to muscle aches. He is not interested in trying another cholesterol medication.     Recent Labs   Lab Test 05/10/19  0931 01/24/19  0502 07/12/17  1621 08/10/16  1615 12/29/15  0916   A1C  --  6.3*  --   --   --    LDL 80 90 85  --  97   HDL 74 77 74  --  73   TRIG 85 56 120  --  54   ALT 50  --  75* 60 71*   CR 0.96 0.96 1.00 1.03 0.94   GFRESTIMATED 88 88 78 76 84   GFRESTBLACK >90 >90 >90   GFR Calc   >90   GFR Calc   >90   GFR Calc     POTASSIUM 4.1 3.9 4.3 4.0 4.6   TSH  --   --  1.41  --  1.16      BP Readings from Last 3 Encounters:   09/27/19 118/72   05/07/19 120/70   03/08/19 140/90    Wt Readings from Last 3 Encounters:   09/27/19 108.4 kg (239 lb)   05/07/19 103.4 kg (228 lb)   03/08/19 107.9 kg (237 lb 14.4 oz)      Reviewed and updated as needed this visit by Provider  Allergies  Meds  Problems       Review of Systems   ROS COMP: CONSTITUTIONAL: NEGATIVE for fever, chills, change in weight  RESP: NEGATIVE for significant cough or SOB  CV: NEGATIVE for chest pain, palpitations or peripheral edema  PSYCHIATRIC: NEGATIVE for changes in mood or  "affect  This document serves as a record of the services and decisions personally performed and made by Kay Garnica MD. It was created on her behalf by Sharon Hung, a trained medical scribe. The creation of this document is based on the provider's statements to the medical scribe.  Sharon Hung September 27, 2019 10:04 AM       Objective    /72   Pulse 67   Temp 98.2  F (36.8  C) (Oral)   Resp 12   Ht 1.956 m (6' 5\")   Wt 108.4 kg (239 lb)   SpO2 97%   BMI 28.34 kg/m    Body mass index is 28.34 kg/m .  Physical Exam   GENERAL: healthy, alert and no distress  RESP: lungs clear to auscultation - no rales, rhonchi or wheezes  CV: regular rate and rhythm, normal S1 S2, no S3 or S4, no murmur, click or rub, no peripheral edema and peripheral pulses strong  Chest wall with xyphoid process without tenderness (area of patient's concern)  PSYCH: mentation appears normal, affect normal/bright    Diagnostic Test Results:  Labs reviewed in Epic  No results found for this or any previous visit (from the past 24 hour(s)).        Assessment & Plan     (R22.2) Lump in chest  (primary encounter diagnosis)  Comment: Xyphoid process.   Plan: reassurance given to patient  Patient to let us know if pain develops or notices growth of area     BMI:   Estimated body mass index is 28.34 kg/m  as calculated from the following:    Height as of this encounter: 1.956 m (6' 5\").    Weight as of this encounter: 108.4 kg (239 lb).   Weight management plan: Discussed healthy diet and exercise guidelines      FUTURE APPOINTMENTS:  Return in about 6 months (around 3/27/2020).    The information in this document, created by the medical scribe for me, accurately reflects the services I personally performed and the decisions made by me. I have reviewed and approved this document for accuracy.   September 27, 2019 10:10 AM   Kay Garnica MD  [facility]    "

## 2019-09-27 NOTE — NURSING NOTE
"/72   Pulse 67   Temp 98.2  F (36.8  C) (Oral)   Resp 12   Ht 1.956 m (6' 5\")   Wt 108.4 kg (239 lb)   SpO2 97%   BMI 28.34 kg/m      "

## 2019-10-15 ENCOUNTER — TELEPHONE (OUTPATIENT)
Dept: INTERNAL MEDICINE | Facility: CLINIC | Age: 54
End: 2019-10-15

## 2019-10-15 NOTE — TELEPHONE ENCOUNTER
Okay to stay on same med and monitor for now.    If URI symptoms do not improve, recommend an appointment

## 2019-10-15 NOTE — TELEPHONE ENCOUNTER
Gabriela advised okay to continue on Mucinex DM and monitor BP. Asked her to have patient call the clinic if BP gets higher or continues to stay elevated after URI symptoms resolve. Recommended appointment if URI symptoms do not improve. Gabriela verbalizes understanding.

## 2019-10-15 NOTE — TELEPHONE ENCOUNTER
Make sure he is not taking anything with pseudoephedrine, which usually raises the blood pressure.    Thanks!

## 2019-10-15 NOTE — TELEPHONE ENCOUNTER
Spoke to patient's wife Gabriela, asked if Mucinex or other medication patient is taking has pseudoephedrine in it.   She states that he is taking Mucinex DM - she does not have the box with her, not taking any other OTC medication.     She thinks BP this morning was 130/86, she states he told her he was feeling fine and not having any other symptoms. BP prior to illness has been around 117/70. Patient did start a new job on Monday, does not report feeling any more stress with new job.

## 2019-10-15 NOTE — TELEPHONE ENCOUNTER
Patients wife calls stating that patient has a dry cough with nasal congestion and has been taking Mucinex over the counter and his blood pressure is spiking.  Wife is wondering if you can suggest something else that will not effect his BP.  Please call Gabriela back at [phone].

## 2019-10-22 NOTE — TELEPHONE ENCOUNTER
Contacted Gabriela. She states that BP has still been going up and down, changed to Coricidin brand of cold medication. Patient has appointment with Dr. Rivera tomorrow for follow-up.     She states that patient's BP has higher than what he was telling her and has been reluctant to take BP medication, only taking hydrochlorothiazide right now. She asks if patient can take another BP medication with hydrochlorothiazide, informed her some patients do take more than 1 medication to control BP. Advised her this can be discussed at appointment tomorrow. Note placed on appointment regarding BP.

## 2019-10-22 NOTE — TELEPHONE ENCOUNTER
The wife Gabriela is calling back because not all the symptoms have resolved. Advised Gabriela the next step is to make an appointment which we did but she would still like to speak with the nurse as well.

## 2019-10-23 ENCOUNTER — TELEPHONE (OUTPATIENT)
Dept: INTERNAL MEDICINE | Facility: CLINIC | Age: 54
End: 2019-10-23

## 2019-10-23 ENCOUNTER — OFFICE VISIT (OUTPATIENT)
Dept: INTERNAL MEDICINE | Facility: CLINIC | Age: 54
End: 2019-10-23
Payer: COMMERCIAL

## 2019-10-23 VITALS
TEMPERATURE: 97.9 F | SYSTOLIC BLOOD PRESSURE: 116 MMHG | BODY MASS INDEX: 27.95 KG/M2 | HEIGHT: 77 IN | RESPIRATION RATE: 18 BRPM | OXYGEN SATURATION: 97 % | WEIGHT: 236.7 LBS | HEART RATE: 79 BPM | DIASTOLIC BLOOD PRESSURE: 74 MMHG

## 2019-10-23 DIAGNOSIS — R05.9 COUGH: ICD-10-CM

## 2019-10-23 DIAGNOSIS — J20.9 ACUTE BRONCHITIS WITH SYMPTOMS > 10 DAYS: Primary | ICD-10-CM

## 2019-10-23 PROCEDURE — 99214 OFFICE O/P EST MOD 30 MIN: CPT | Performed by: INTERNAL MEDICINE

## 2019-10-23 RX ORDER — AZITHROMYCIN 250 MG/1
TABLET, FILM COATED ORAL
Qty: 6 TABLET | Refills: 0 | Status: SHIPPED | OUTPATIENT
Start: 2019-10-23 | End: 2019-11-11

## 2019-10-23 RX ORDER — ALBUTEROL SULFATE 90 UG/1
1-2 AEROSOL, METERED RESPIRATORY (INHALATION) EVERY 6 HOURS PRN
Qty: 1 INHALER | Refills: 1 | Status: SHIPPED | OUTPATIENT
Start: 2019-10-23 | End: 2022-01-11

## 2019-10-23 RX ORDER — CODEINE PHOSPHATE AND GUAIFENESIN 10; 100 MG/5ML; MG/5ML
1-2 SOLUTION ORAL
Qty: 120 ML | Refills: 1 | Status: SHIPPED | OUTPATIENT
Start: 2019-10-23 | End: 2019-11-11

## 2019-10-23 ASSESSMENT — MIFFLIN-ST. JEOR: SCORE: 2021.04

## 2019-10-23 NOTE — PROGRESS NOTES
"Subjective     iMlad Brown is a 54 year old male who presents with wife to clinic today for the following health issues:    Patient states he has a cold that is not getting any better and has had an elevated blood pressure.    HPI     Cough  Symptoms started 2 weeks ago with a cough, and he developed post nasal drip shortly afterward. The post nasal drip and cough are constant and very bothersome for both patient and wife. The cough is keeping him up at night. Cough is not productive. Patient comments that his nose and throat are itchy.     Wife suspects that he has a hx of fall allergies, although patient does not think so. He does not have a hx of asthma. Denies any heartburn symptoms.     Other problems to add on...  -Notes that he has not started rosuvastatin yet. He's been trying to make lifestyle changes first.       Reviewed and updated as needed this visit by Provider         Review of Systems   No dyspnea or cough. No chest discomfort, dizziness or palpitations. No diarrhea, abdominal pain or rectal bleeding.   No acute problems with vision or speech, lateralizing weakness or paresthesias.    ROS: as above or negative for constitutional, ENT, Respiratory, CV, GI systems.    This document serves as a record of the services and decisions personally performed and made by Javier Rivera MD. It was created on his behalf by Gayatri Denis, a trained medical scribe. The creation of this document is based on the provider's statements to the medical scribe.  Gayatri Denis October 23, 2019 3:41 PM         Objective    /74 (BP Location: Left arm, Patient Position: Sitting, Cuff Size: Adult Regular)   Pulse 79   Temp 97.9  F (36.6  C) (Oral)   Resp 18   Ht 1.956 m (6' 5\")   Wt 107.4 kg (236 lb 11.2 oz)   SpO2 97%   BMI 28.07 kg/m    Body mass index is 28.07 kg/m .     Physical Exam   GENERAL: healthy, alert and no distress  EYES: Eyes grossly normal to inspection, and conjunctivae and sclerae " "normal  HENT: Wax in right ear canal. Visible portion of right TM clear. Left ear canal and TM clear. nose and mouth without ulcers or lesions  NECK: no adenopathy, no asymmetry, masses, or scars and thyroid normal to palpation  RESP: lungs clear to auscultation - no rales. Very faint diffuse expiratory wheezing and slight epiratory phase prolongation.   CV: regular rate and rhythm, normal S1 S2, no S3 or S4, no murmur, click or rub, no peripheral edema and peripheral pulses strong  ABDOMEN: soft, nontender, no hepatosplenomegaly, no masses and bowel sounds normal  MS: no gross musculoskeletal defects noted, no edema  SKIN: no suspicious lesions or rashes  NEURO: Normal strength and tone, mentation intact and speech normal  PSYCH: mentation appears normal, affect normal/bright    Diagnostic Test Results:  Labs reviewed in Epic        Assessment & Plan     (J20.9) Acute bronchitis with symptoms > 10 days  (primary encounter diagnosis)  Comment: See below  Plan: azithromycin (ZITHROMAX) 250 MG tablet          (R05) Cough  Comment: Discussed differential diagnoses for cough. Offered azithromycin due to duration of symptoms. Recommended Robitussin AC nightly prn, albuterol prn for any wheezing, and may also consider Flonase daily. Discussed and reviewed potential side effects of medications, will let me know if experiencing any.   Plan: guaiFENesin-codeine (ROBITUSSIN AC) 100-10         MG/5ML solution, albuterol (PROAIR         HFA/PROVENTIL HFA/VENTOLIN HFA) 108 (90 Base)         MCG/ACT inhaler           BMI:   Estimated body mass index is 28.07 kg/m  as calculated from the following:    Height as of this encounter: 1.956 m (6' 5\").    Weight as of this encounter: 107.4 kg (236 lb 11.2 oz).   Weight management plan: Discussed healthy diet and exercise guidelines    FUTURE APPOINTMENTS:       - Follow-up visit on 11/11/2019 for physical with PCP, sooner if symptoms worsen.     Patient Instructions   Some parts of the " "story sound like allergies and/or temporary asthma related to allergies.     There may be also a possible viral or bacterial component.       Recommendations:  Cough syrup with codeine at bedtime, suppresses cough, doesn't treat the cause. May cause nausea.     Antibiotic (Zithromax), in case there is any bacterial component.     Albuterol inhaler, for possible \"temporary asthma\" causing a wheezy nonproductive cough.     Antihistamine pills, available without prescription, such as cetirizine (Zyrtec) or loratadine (Claritin).   Consider Flonase inhaler, good for nasal allergies, must use daily for at least a week or two to see benefit.     See Dr Garnica again as scheduled on 11/11/2019, sooner if feeling poorly.       The information in this document, created by the medical scribe for me, accurately reflects the services I personally performed and the decisions made by me. I have reviewed and approved this document for accuracy prior to leaving the patient care area.  October 23, 2019 3:57 PM    Javier Rivera MD,   [facility]        "

## 2019-10-23 NOTE — TELEPHONE ENCOUNTER
Loan at the Pharmacy called to advise they have to cut the qty of the guaiFENesin-codeine (ROBITUSSIN AC) 100-10 MG/5ML solution to a 7 day supply with no refills for an acute cough due to new regulations.    If this is not for acute but rather chronic it would need to be filled as a 30 day supply and refilled every 30 days and not as needed.    The will fill as a 7 day supply unless they hear differently from Dr. Rivera or his nurse.

## 2019-10-23 NOTE — PATIENT INSTRUCTIONS
"Some parts of the story sound like allergies and/or temporary asthma related to allergies.     There may be also a possible viral or bacterial component.       Recommendations:  Cough syrup with codeine at bedtime, suppresses cough, doesn't treat the cause. May cause nausea.     Antibiotic (Zithromax), in case there is any bacterial component.     Albuterol inhaler, for possible \"temporary asthma\" causing a wheezy nonproductive cough.     Antihistamine pills, available without prescription, such as cetirizine (Zyrtec) or loratadine (Claritin).   Consider Flonase inhaler, good for nasal allergies, must use daily for at least a week or two to see benefit.     See Dr Garnica again as scheduled on 11/11/2019, sooner if feeling poorly.   "

## 2019-10-23 NOTE — NURSING NOTE
"/74 (BP Location: Left arm, Patient Position: Sitting, Cuff Size: Adult Regular)   Pulse 79   Temp 97.9  F (36.6  C) (Oral)   Resp 18   Ht 1.956 m (6' 5\")   Wt 107.4 kg (236 lb 11.2 oz)   SpO2 97%   BMI 28.07 kg/m     Patient states he has a cold that is not getting any better and has had an elevated blood pressure.  "

## 2019-11-03 ENCOUNTER — HEALTH MAINTENANCE LETTER (OUTPATIENT)
Age: 54
End: 2019-11-03

## 2019-11-11 ENCOUNTER — OFFICE VISIT (OUTPATIENT)
Dept: INTERNAL MEDICINE | Facility: CLINIC | Age: 54
End: 2019-11-11
Payer: COMMERCIAL

## 2019-11-11 VITALS
TEMPERATURE: 97.9 F | OXYGEN SATURATION: 100 % | HEIGHT: 77 IN | HEART RATE: 68 BPM | RESPIRATION RATE: 12 BRPM | WEIGHT: 231 LBS | SYSTOLIC BLOOD PRESSURE: 112 MMHG | BODY MASS INDEX: 27.28 KG/M2 | DIASTOLIC BLOOD PRESSURE: 70 MMHG

## 2019-11-11 DIAGNOSIS — Z23 NEED FOR PROPHYLACTIC VACCINATION AND INOCULATION AGAINST INFLUENZA: ICD-10-CM

## 2019-11-11 DIAGNOSIS — Z00.00 ROUTINE GENERAL MEDICAL EXAMINATION AT A HEALTH CARE FACILITY: Primary | ICD-10-CM

## 2019-11-11 LAB
BASOPHILS # BLD AUTO: 0 10E9/L (ref 0–0.2)
BASOPHILS NFR BLD AUTO: 0.2 %
DIFFERENTIAL METHOD BLD: ABNORMAL
EOSINOPHIL # BLD AUTO: 0.1 10E9/L (ref 0–0.7)
EOSINOPHIL NFR BLD AUTO: 3 %
ERYTHROCYTE [DISTWIDTH] IN BLOOD BY AUTOMATED COUNT: 13.1 % (ref 10–15)
HBA1C MFR BLD: 6 % (ref 0–5.6)
HCT VFR BLD AUTO: 41.2 % (ref 40–53)
HGB BLD-MCNC: 13.1 G/DL (ref 13.3–17.7)
LYMPHOCYTES # BLD AUTO: 1.8 10E9/L (ref 0.8–5.3)
LYMPHOCYTES NFR BLD AUTO: 44.9 %
MCH RBC QN AUTO: 27.2 PG (ref 26.5–33)
MCHC RBC AUTO-ENTMCNC: 31.8 G/DL (ref 31.5–36.5)
MCV RBC AUTO: 86 FL (ref 78–100)
MONOCYTES # BLD AUTO: 0.4 10E9/L (ref 0–1.3)
MONOCYTES NFR BLD AUTO: 9.2 %
NEUTROPHILS # BLD AUTO: 1.7 10E9/L (ref 1.6–8.3)
NEUTROPHILS NFR BLD AUTO: 42.7 %
PLATELET # BLD AUTO: 175 10E9/L (ref 150–450)
RBC # BLD AUTO: 4.81 10E12/L (ref 4.4–5.9)
WBC # BLD AUTO: 4 10E9/L (ref 4–11)

## 2019-11-11 PROCEDURE — 90471 IMMUNIZATION ADMIN: CPT | Performed by: INTERNAL MEDICINE

## 2019-11-11 PROCEDURE — 80050 GENERAL HEALTH PANEL: CPT | Performed by: INTERNAL MEDICINE

## 2019-11-11 PROCEDURE — G0103 PSA SCREENING: HCPCS | Performed by: INTERNAL MEDICINE

## 2019-11-11 PROCEDURE — 36415 COLL VENOUS BLD VENIPUNCTURE: CPT | Performed by: INTERNAL MEDICINE

## 2019-11-11 PROCEDURE — 83036 HEMOGLOBIN GLYCOSYLATED A1C: CPT | Performed by: INTERNAL MEDICINE

## 2019-11-11 PROCEDURE — 80061 LIPID PANEL: CPT | Performed by: INTERNAL MEDICINE

## 2019-11-11 PROCEDURE — 99396 PREV VISIT EST AGE 40-64: CPT | Performed by: INTERNAL MEDICINE

## 2019-11-11 PROCEDURE — 90682 RIV4 VACC RECOMBINANT DNA IM: CPT | Performed by: INTERNAL MEDICINE

## 2019-11-11 ASSESSMENT — ENCOUNTER SYMPTOMS
CHILLS: 0
CONSTIPATION: 0
SORE THROAT: 0
HEMATOCHEZIA: 0
DIZZINESS: 0
DIARRHEA: 0
PARESTHESIAS: 0
MYALGIAS: 0
NERVOUS/ANXIOUS: 0
WEAKNESS: 0
COUGH: 0
HEARTBURN: 0
HEMATURIA: 0
FEVER: 0
PALPITATIONS: 0
EYE PAIN: 0
SHORTNESS OF BREATH: 0
ABDOMINAL PAIN: 0
HEADACHES: 0
FREQUENCY: 0
JOINT SWELLING: 0
NAUSEA: 0
ARTHRALGIAS: 0
DYSURIA: 0

## 2019-11-11 ASSESSMENT — MIFFLIN-ST. JEOR: SCORE: 1995.19

## 2019-11-11 NOTE — NURSING NOTE
"/70   Pulse 68   Temp 97.9  F (36.6  C) (Oral)   Resp 12   Ht 1.956 m (6' 5\")   Wt 104.8 kg (231 lb)   SpO2 100%   BMI 27.39 kg/m      "

## 2019-11-11 NOTE — PROGRESS NOTES
SUBJECTIVE:   CC: Milad Brown is an 54 year old male who presents for preventative health visit.     Healthy Habits:     Getting at least 3 servings of Calcium per day:  Yes    Bi-annual eye exam:  Yes    Dental care twice a year:  Yes    Sleep apnea or symptoms of sleep apnea:  Sleep apnea    Diet:  Low salt    Frequency of exercise:  6-7 days/week    Duration of exercise:  45-60 minutes    Taking medications regularly:  Yes    Medication side effects:  None    PHQ-2 Total Score: 0    Additional concerns today:  No        Today's PHQ-2 Score:   PHQ-2 ( 1999 Pfizer) 11/11/2019   Q1: Little interest or pleasure in doing things 0   Q2: Feeling down, depressed or hopeless 0   PHQ-2 Score 0   Q1: Little interest or pleasure in doing things Not at all   Q2: Feeling down, depressed or hopeless Not at all   PHQ-2 Score 0       Abuse: Current or Past(Physical, Sexual or Emotional)- No  Do you feel safe in your environment? Yes        Social History     Tobacco Use     Smoking status: Never Smoker     Smokeless tobacco: Never Used   Substance Use Topics     Alcohol use: No     If you drink alcohol do you typically have >3 drinks per day or >7 drinks per week? No    Alcohol Use 11/11/2019   Prescreen: >3 drinks/day or >7 drinks/week? Not Applicable   Prescreen: >3 drinks/day or >7 drinks/week? -       Last PSA:   PSA   Date Value Ref Range Status   07/12/2017 0.92 0 - 4 ug/L Final     Comment:     Assay Method:  Chemiluminescence using Siemens Vista analyzer       Reviewed orders with patient. Reviewed health maintenance and updated orders accordingly - Yes      Reviewed and updated as needed this visit by clinical staff  Tobacco  Allergies  Meds  Med Hx  Surg Hx  Fam Hx  Soc Hx        Reviewed and updated as needed this visit by Provider  Meds            Review of Systems   Constitutional: Negative for chills and fever.   HENT: Negative for congestion, ear pain, hearing loss and sore throat.    Eyes: Negative  "for pain and visual disturbance.   Respiratory: Negative for cough and shortness of breath.    Cardiovascular: Negative for chest pain, palpitations and peripheral edema.   Gastrointestinal: Negative for abdominal pain, constipation, diarrhea, heartburn, hematochezia and nausea.   Genitourinary: Negative for discharge, dysuria, frequency, genital sores, hematuria, impotence and urgency.   Musculoskeletal: Negative for arthralgias, joint swelling and myalgias.   Skin: Negative for rash.   Neurological: Negative for dizziness, weakness, headaches and paresthesias.   Psychiatric/Behavioral: Negative for mood changes. The patient is not nervous/anxious.      lipitor and crestor caused muscle fatigue and soreness.  He did not try cutting the pill in half or taking every other day.    OBJECTIVE:   /70   Pulse 68   Temp 97.9  F (36.6  C) (Oral)   Resp 12   Ht 1.956 m (6' 5\")   Wt 104.8 kg (231 lb)   SpO2 100%   BMI 27.39 kg/m      Physical Exam  GENERAL: healthy, alert and no distress  EYES: Eyes grossly normal to inspection, PERRL and conjunctivae and sclerae normal  HENT: ear canals and TM's normal, nose and mouth without ulcers or lesions  NECK: no adenopathy, no asymmetry, masses, or scars and thyroid normal to palpation  RESP: lungs clear to auscultation - no rales, rhonchi or wheezes  CV: regular rate and rhythm, normal S1 S2, no S3 or S4, no murmur, click or rub, no peripheral edema and peripheral pulses strong  ABDOMEN: soft, nontender, no hepatosplenomegaly, no masses and bowel sounds normal  MS: no gross musculoskeletal defects noted, no edema  SKIN: no suspicious lesions or rashes  NEURO: Normal strength and tone, mentation intact and speech normal  PSYCH: mentation appears normal, affect normal/bright    Diagnostic Test Results:  Labs reviewed in Epic    ASSESSMENT/PLAN:       (Z00.00) Routine general medical examination at a health care facility  (primary encounter diagnosis)  Comment:   Plan: CBC " "with platelets differential, Lipid panel         reflex to direct LDL Fasting, Comprehensive         metabolic panel, Hemoglobin A1c, TSH with free         T4 reflex, PSA, screen            TIA.  Follows with neurology.  On aspirin. Has not been tolerating cholesterol  Medication.  Recommend he try taking 1/2 the crestor or taking every other day.      COUNSELING:   Reviewed preventive health counseling, as reflected in patient instructions       Regular exercise       Healthy diet/nutrition    Estimated body mass index is 27.39 kg/m  as calculated from the following:    Height as of this encounter: 1.956 m (6' 5\").    Weight as of this encounter: 104.8 kg (231 lb).          reports that he has never smoked. He has never used smokeless tobacco.      Counseling Resources:  ATP IV Guidelines  Pooled Cohorts Equation Calculator  FRAX Risk Assessment  ICSI Preventive Guidelines  Dietary Guidelines for Americans, 2010  USDA's MyPlate  ASA Prophylaxis  Lung CA Screening    Kay Garnica MD  [facility]  "

## 2019-11-12 LAB
ALBUMIN SERPL-MCNC: 3.9 G/DL (ref 3.4–5)
ALP SERPL-CCNC: 62 U/L (ref 40–150)
ALT SERPL W P-5'-P-CCNC: 78 U/L (ref 0–70)
ANION GAP SERPL CALCULATED.3IONS-SCNC: 4 MMOL/L (ref 3–14)
AST SERPL W P-5'-P-CCNC: 42 U/L (ref 0–45)
BILIRUB SERPL-MCNC: 0.3 MG/DL (ref 0.2–1.3)
BUN SERPL-MCNC: 20 MG/DL (ref 7–30)
CALCIUM SERPL-MCNC: 9 MG/DL (ref 8.5–10.1)
CHLORIDE SERPL-SCNC: 110 MMOL/L (ref 94–109)
CHOLEST SERPL-MCNC: 162 MG/DL
CO2 SERPL-SCNC: 27 MMOL/L (ref 20–32)
CREAT SERPL-MCNC: 0.88 MG/DL (ref 0.66–1.25)
GFR SERPL CREATININE-BSD FRML MDRD: >90 ML/MIN/{1.73_M2}
GLUCOSE SERPL-MCNC: 93 MG/DL (ref 70–99)
HDLC SERPL-MCNC: 64 MG/DL
LDLC SERPL CALC-MCNC: 88 MG/DL
NONHDLC SERPL-MCNC: 98 MG/DL
POTASSIUM SERPL-SCNC: 4.7 MMOL/L (ref 3.4–5.3)
PROT SERPL-MCNC: 7.1 G/DL (ref 6.8–8.8)
PSA SERPL-ACNC: 1.07 UG/L (ref 0–4)
SODIUM SERPL-SCNC: 141 MMOL/L (ref 133–144)
TRIGL SERPL-MCNC: 49 MG/DL
TSH SERPL DL<=0.005 MIU/L-ACNC: 0.9 MU/L (ref 0.4–4)

## 2019-12-30 ENCOUNTER — TRANSFERRED RECORDS (OUTPATIENT)
Dept: HEALTH INFORMATION MANAGEMENT | Facility: CLINIC | Age: 54
End: 2019-12-30

## 2020-02-24 DIAGNOSIS — I10 ESSENTIAL HYPERTENSION: ICD-10-CM

## 2020-02-24 NOTE — TELEPHONE ENCOUNTER
"Requested Prescriptions   Pending Prescriptions Disp Refills     hydrochlorothiazide (HYDRODIURIL) 12.5 MG tablet  Last Written Prescription Date:  05/07/19  Last Fill Quantity: 90,  # refills: 4   Last office visit: 11/11/2019 with prescribing provider:  11/11/19   Future Office Visit:     90 tablet 4     Sig: Take 2 tablets (25 mg) by mouth daily       Diuretics (Including Combos) Protocol Passed - 2/24/2020  8:46 AM        Passed - Blood pressure under 140/90 in past 12 months     BP Readings from Last 3 Encounters:   11/11/19 112/70   10/23/19 116/74   09/27/19 118/72                 Passed - Recent (12 mo) or future (30 days) visit within the authorizing provider's specialty     Patient has had an office visit with the authorizing provider or a provider within the authorizing providers department within the previous 12 mos or has a future within next 30 days. See \"Patient Info\" tab in inbasket, or \"Choose Columns\" in Meds & Orders section of the refill encounter.              Passed - Medication is active on med list        Passed - Patient is age 18 or older        Passed - Normal serum creatinine on file in past 12 months     Recent Labs   Lab Test 11/11/19  0945   CR 0.88              Passed - Normal serum potassium on file in past 12 months     Recent Labs   Lab Test 11/11/19  0945   POTASSIUM 4.7                    Passed - Normal serum sodium on file in past 12 months     Recent Labs   Lab Test 11/11/19  0945                   "

## 2020-02-25 RX ORDER — HYDROCHLOROTHIAZIDE 12.5 MG/1
25 TABLET ORAL DAILY
Qty: 90 TABLET | Refills: 4 | OUTPATIENT
Start: 2020-02-25

## 2020-02-26 DIAGNOSIS — I10 ESSENTIAL HYPERTENSION: ICD-10-CM

## 2020-02-26 NOTE — TELEPHONE ENCOUNTER
"Requested Prescriptions   Pending Prescriptions Disp Refills     hydrochlorothiazide (HYDRODIURIL) 12.5 MG tablet [Pharmacy Med Name:  Last Written Prescription Date:  5/7/2019  Last Fill Quantity: 90,  # refills: 4   Last office visit: 11/11/2019 with prescribing provider:     Future Office Visit:   hydroCHLOROthiazide 12.5 MG Oral Tablet]  0     Sig: TAKE 2 TABLETS (25MG) BY MOUTH DAILY       Diuretics (Including Combos) Protocol Passed - 2/26/2020  5:30 AM        Passed - Blood pressure under 140/90 in past 12 months     BP Readings from Last 3 Encounters:   11/11/19 112/70   10/23/19 116/74   09/27/19 118/72                 Passed - Recent (12 mo) or future (30 days) visit within the authorizing provider's specialty     Patient has had an office visit with the authorizing provider or a provider within the authorizing providers department within the previous 12 mos or has a future within next 30 days. See \"Patient Info\" tab in inbasket, or \"Choose Columns\" in Meds & Orders section of the refill encounter.              Passed - Medication is active on med list        Passed - Patient is age 18 or older        Passed - Normal serum creatinine on file in past 12 months     Recent Labs   Lab Test 11/11/19  0945   CR 0.88              Passed - Normal serum potassium on file in past 12 months     Recent Labs   Lab Test 11/11/19  0945   POTASSIUM 4.7                    Passed - Normal serum sodium on file in past 12 months     Recent Labs   Lab Test 11/11/19  0945                 "

## 2020-02-27 RX ORDER — HYDROCHLOROTHIAZIDE 12.5 MG/1
TABLET ORAL
Qty: 180 TABLET | Refills: 3 | Status: SHIPPED | OUTPATIENT
Start: 2020-02-27 | End: 2021-01-20

## 2020-02-27 NOTE — TELEPHONE ENCOUNTER
Patient is taking medication 2 times daily, last refilled 5/7/19 for 45 day refill with 4 additional refills. Patient is due for refill. Prescription approved per [facility] Refill Protocol.

## 2020-03-18 ENCOUNTER — TELEPHONE (OUTPATIENT)
Dept: INTERNAL MEDICINE | Facility: CLINIC | Age: 55
End: 2020-03-18

## 2020-03-18 NOTE — TELEPHONE ENCOUNTER
Left detailed voicemail on patient's cell advising an E-visit as he is active in My Chart.  Gave instructions on how to get to My Chart then how to request an E visit.  KERLINE Bolton R.N.

## 2020-03-18 NOTE — TELEPHONE ENCOUNTER
Patient's wife is calling to request cough syrup with codeine for Milad. She says his allergies are flaring and his cough is getting worse because of this. Milad is a  for [employer] so he is not available by phone.

## 2020-11-16 ENCOUNTER — HEALTH MAINTENANCE LETTER (OUTPATIENT)
Age: 55
End: 2020-11-16

## 2021-01-15 ENCOUNTER — HEALTH MAINTENANCE LETTER (OUTPATIENT)
Age: 56
End: 2021-01-15

## 2021-01-20 ENCOUNTER — OFFICE VISIT (OUTPATIENT)
Dept: INTERNAL MEDICINE | Facility: CLINIC | Age: 56
End: 2021-01-20
Payer: COMMERCIAL

## 2021-01-20 VITALS
RESPIRATION RATE: 16 BRPM | OXYGEN SATURATION: 98 % | HEIGHT: 76 IN | WEIGHT: 230 LBS | SYSTOLIC BLOOD PRESSURE: 130 MMHG | DIASTOLIC BLOOD PRESSURE: 76 MMHG | TEMPERATURE: 98.1 F | HEART RATE: 79 BPM | BODY MASS INDEX: 28.01 KG/M2

## 2021-01-20 DIAGNOSIS — Z00.00 ROUTINE GENERAL MEDICAL EXAMINATION AT A HEALTH CARE FACILITY: Primary | ICD-10-CM

## 2021-01-20 DIAGNOSIS — I10 ESSENTIAL HYPERTENSION: ICD-10-CM

## 2021-01-20 DIAGNOSIS — G45.9 TIA (TRANSIENT ISCHEMIC ATTACK): ICD-10-CM

## 2021-01-20 PROCEDURE — 99396 PREV VISIT EST AGE 40-64: CPT | Performed by: INTERNAL MEDICINE

## 2021-01-20 RX ORDER — HYDROCHLOROTHIAZIDE 12.5 MG/1
TABLET ORAL
Qty: 180 TABLET | Refills: 3 | Status: SHIPPED | OUTPATIENT
Start: 2021-01-20 | End: 2021-03-22

## 2021-01-20 ASSESSMENT — ENCOUNTER SYMPTOMS
HEARTBURN: 0
SHORTNESS OF BREATH: 0
MYALGIAS: 0
JOINT SWELLING: 0
DIARRHEA: 0
WEAKNESS: 0
HEMATURIA: 0
DIZZINESS: 0
NAUSEA: 0
COUGH: 0
DYSURIA: 0
CHILLS: 0
EYE PAIN: 0
FREQUENCY: 0
ABDOMINAL PAIN: 0
CONSTIPATION: 0
HEADACHES: 0
ARTHRALGIAS: 0
FEVER: 0
HEMATOCHEZIA: 0
NERVOUS/ANXIOUS: 0
PALPITATIONS: 0
PARESTHESIAS: 0
SORE THROAT: 0

## 2021-01-20 ASSESSMENT — MIFFLIN-ST. JEOR: SCORE: 1961.83

## 2021-01-20 NOTE — PROGRESS NOTES
SUBJECTIVE:   CC: Milad Brown is an 55 year old male who presents for preventative health visit.       Patient has been advised of split billing requirements and indicates understanding: Yes  Healthy Habits:     Getting at least 3 servings of Calcium per day:  Yes    Bi-annual eye exam:  NO    Dental care twice a year:  Yes    Sleep apnea or symptoms of sleep apnea:  None    Diet:  Low salt    Frequency of exercise:  4-5 days/week    Duration of exercise:  Less than 15 minutes    Taking medications regularly:  Yes    Medication side effects:  None    PHQ-2 Total Score: 0    Additional concerns today:  No        Today's PHQ-2 Score:   PHQ-2 ( 1999 Pfizer) 1/20/2021   Q1: Little interest or pleasure in doing things 0   Q2: Feeling down, depressed or hopeless 0   PHQ-2 Score 0   Q1: Little interest or pleasure in doing things Not at all   Q2: Feeling down, depressed or hopeless Not at all   PHQ-2 Score 0       Abuse: Current or Past(Physical, Sexual or Emotional)- No  Do you feel safe in your environment? Yes        Social History     Tobacco Use     Smoking status: Never Smoker     Smokeless tobacco: Never Used   Substance Use Topics     Alcohol use: No     If you drink alcohol do you typically have >3 drinks per day or >7 drinks per week? No    Alcohol Use 1/20/2021   Prescreen: >3 drinks/day or >7 drinks/week? No   Prescreen: >3 drinks/day or >7 drinks/week? -       Last PSA:   PSA   Date Value Ref Range Status   11/11/2019 1.07 0 - 4 ug/L Final     Comment:     Assay Method:  Chemiluminescence using Siemens Vista analyzer       Reviewed orders with patient. Reviewed health maintenance and updated orders accordingly     Reviewed and updated as needed this visit by clinical staff  Tobacco  Allergies  Meds  Problems  Med Hx  Surg Hx  Fam Hx  Soc Hx          Reviewed and updated as needed this visit by Provider   Allergies  Meds  Problems                Review of Systems   Constitutional: Negative for  "chills and fever.   HENT: Negative for congestion, ear pain, hearing loss and sore throat.    Eyes: Negative for pain and visual disturbance.   Respiratory: Negative for cough and shortness of breath.    Cardiovascular: Negative for chest pain, palpitations and peripheral edema.   Gastrointestinal: Negative for abdominal pain, constipation, diarrhea, heartburn, hematochezia and nausea.   Genitourinary: Negative for discharge, dysuria, frequency, genital sores, hematuria, impotence and urgency.   Musculoskeletal: Negative for arthralgias, joint swelling and myalgias.   Skin: Negative for rash.   Neurological: Negative for dizziness, weakness, headaches and paresthesias.   Psychiatric/Behavioral: Negative for mood changes. The patient is not nervous/anxious.      CONSTITUTIONAL: NEGATIVE for fever, chills, change in weight  INTEGUMENTARY/SKIN: NEGATIVE for worrisome rashes, moles or lesions  EYES: NEGATIVE for vision changes or irritation  ENT: NEGATIVE for ear, mouth and throat problems  RESP: NEGATIVE for significant cough or SOB  CV: NEGATIVE for chest pain, palpitations or peripheral edema  GI: NEGATIVE for nausea, abdominal pain, heartburn, or change in bowel habits   male: negative for dysuria, hematuria, decreased urinary stream, erectile dysfunction, urethral discharge  MUSCULOSKELETAL: NEGATIVE for significant arthralgias or myalgia  NEURO: NEGATIVE for weakness, dizziness or paresthesias  PSYCHIATRIC: NEGATIVE for changes in mood or affect    OBJECTIVE:   /76   Pulse 79   Temp 98.1  F (36.7  C) (Oral)   Resp 16   Ht 1.918 m (6' 3.5\")   Wt 104.3 kg (230 lb)   SpO2 98%   BMI 28.37 kg/m      Physical Exam  GENERAL: healthy, alert and no distress  EYES: Eyes grossly normal to inspection, PERRL and conjunctivae and sclerae normal  HENT: ear canals and TM's normal, nose and mouth without ulcers or lesions  NECK: no adenopathy, no asymmetry, masses, or scars and thyroid normal to palpation  RESP: " "lungs clear to auscultation - no rales, rhonchi or wheezes  CV: regular rate and rhythm, normal S1 S2, no S3 or S4, no murmur, click or rub, no peripheral edema and peripheral pulses strong  ABDOMEN: soft, nontender, no hepatosplenomegaly, no masses and bowel sounds normal  MS: no gross musculoskeletal defects noted, no edema  SKIN: no suspicious lesions or rashes  NEURO: Normal strength and tone, mentation intact and speech normal  PSYCH: mentation appears normal, affect normal/bright    Diagnostic Test Results:  Labs reviewed in Epic    ASSESSMENT/PLAN:       ICD-10-CM    1. Routine general medical examination at a health care facility  Z00.00 Lipid panel reflex to direct LDL Fasting     Comprehensive metabolic panel (BMP + Alb, Alk Phos, ALT, AST, Total. Bili, TP)     CBC with platelets and differential     Hemoglobin A1c     PSA, screen   2. Essential hypertension  I10 hydrochlorothiazide (HYDRODIURIL) 12.5 MG tablet   3. TIA (transient ischemic attack)  G45.9    -aspirin  -has not tolerated statins    Patient has been advised of split billing requirements and indicates understanding: Yes  COUNSELING:   Reviewed preventive health counseling, as reflected in patient instructions       Regular exercise       Healthy diet/nutrition    Estimated body mass index is 28.37 kg/m  as calculated from the following:    Height as of this encounter: 1.918 m (6' 3.5\").    Weight as of this encounter: 104.3 kg (230 lb).         He reports that he has never smoked. He has never used smokeless tobacco.      Counseling Resources:  ATP IV Guidelines  Pooled Cohorts Equation Calculator  FRAX Risk Assessment  ICSI Preventive Guidelines  Dietary Guidelines for Americans, 2010  USDA's MyPlate  ASA Prophylaxis  Lung CA Screening    Kay Garnica MD  [facility]  "

## 2021-02-06 ENCOUNTER — TELEPHONE (OUTPATIENT)
Dept: INTERNAL MEDICINE | Facility: CLINIC | Age: 56
End: 2021-02-06

## 2021-02-06 DIAGNOSIS — Z00.00 ROUTINE GENERAL MEDICAL EXAMINATION AT A HEALTH CARE FACILITY: ICD-10-CM

## 2021-02-06 DIAGNOSIS — R79.89 ELEVATED LFTS: Primary | ICD-10-CM

## 2021-02-06 LAB
ALBUMIN SERPL-MCNC: 3.8 G/DL (ref 3.4–5)
ALP SERPL-CCNC: 70 U/L (ref 40–150)
ALT SERPL W P-5'-P-CCNC: 73 U/L (ref 0–70)
ANION GAP SERPL CALCULATED.3IONS-SCNC: 3 MMOL/L (ref 3–14)
AST SERPL W P-5'-P-CCNC: 37 U/L (ref 0–45)
BASOPHILS # BLD AUTO: 0 10E9/L (ref 0–0.2)
BASOPHILS NFR BLD AUTO: 0.2 %
BILIRUB SERPL-MCNC: 0.4 MG/DL (ref 0.2–1.3)
BUN SERPL-MCNC: 19 MG/DL (ref 7–30)
CALCIUM SERPL-MCNC: 9.7 MG/DL (ref 8.5–10.1)
CHLORIDE SERPL-SCNC: 107 MMOL/L (ref 94–109)
CHOLEST SERPL-MCNC: 183 MG/DL
CO2 SERPL-SCNC: 29 MMOL/L (ref 20–32)
CREAT SERPL-MCNC: 0.97 MG/DL (ref 0.66–1.25)
DIFFERENTIAL METHOD BLD: ABNORMAL
EOSINOPHIL # BLD AUTO: 0.2 10E9/L (ref 0–0.7)
EOSINOPHIL NFR BLD AUTO: 3.5 %
ERYTHROCYTE [DISTWIDTH] IN BLOOD BY AUTOMATED COUNT: 13.7 % (ref 10–15)
GFR SERPL CREATININE-BSD FRML MDRD: 86 ML/MIN/{1.73_M2}
GLUCOSE SERPL-MCNC: 91 MG/DL (ref 70–99)
HBA1C MFR BLD: 6.1 % (ref 0–5.6)
HCT VFR BLD AUTO: 41.7 % (ref 40–53)
HDLC SERPL-MCNC: 80 MG/DL
HGB BLD-MCNC: 13.3 G/DL (ref 13.3–17.7)
LDLC SERPL CALC-MCNC: 90 MG/DL
LYMPHOCYTES # BLD AUTO: 2.2 10E9/L (ref 0.8–5.3)
LYMPHOCYTES NFR BLD AUTO: 51.6 %
MCH RBC QN AUTO: 27.5 PG (ref 26.5–33)
MCHC RBC AUTO-ENTMCNC: 31.9 G/DL (ref 31.5–36.5)
MCV RBC AUTO: 86 FL (ref 78–100)
MONOCYTES # BLD AUTO: 0.6 10E9/L (ref 0–1.3)
MONOCYTES NFR BLD AUTO: 13 %
NEUTROPHILS # BLD AUTO: 1.4 10E9/L (ref 1.6–8.3)
NEUTROPHILS NFR BLD AUTO: 31.7 %
NONHDLC SERPL-MCNC: 103 MG/DL
PLATELET # BLD AUTO: 177 10E9/L (ref 150–450)
POTASSIUM SERPL-SCNC: 4.3 MMOL/L (ref 3.4–5.3)
PROT SERPL-MCNC: 7.2 G/DL (ref 6.8–8.8)
PSA SERPL-ACNC: 1.04 UG/L (ref 0–4)
RBC # BLD AUTO: 4.84 10E12/L (ref 4.4–5.9)
SODIUM SERPL-SCNC: 139 MMOL/L (ref 133–144)
TRIGL SERPL-MCNC: 66 MG/DL
WBC # BLD AUTO: 4.3 10E9/L (ref 4–11)

## 2021-02-06 PROCEDURE — 80053 COMPREHEN METABOLIC PANEL: CPT | Performed by: INTERNAL MEDICINE

## 2021-02-06 PROCEDURE — 36415 COLL VENOUS BLD VENIPUNCTURE: CPT | Performed by: INTERNAL MEDICINE

## 2021-02-06 PROCEDURE — 80061 LIPID PANEL: CPT | Performed by: INTERNAL MEDICINE

## 2021-02-06 PROCEDURE — 85025 COMPLETE CBC W/AUTO DIFF WBC: CPT | Performed by: INTERNAL MEDICINE

## 2021-02-06 PROCEDURE — G0103 PSA SCREENING: HCPCS | Performed by: INTERNAL MEDICINE

## 2021-02-06 PROCEDURE — 83036 HEMOGLOBIN GLYCOSYLATED A1C: CPT | Performed by: INTERNAL MEDICINE

## 2021-02-08 NOTE — TELEPHONE ENCOUNTER
Please call patient and let him know I am ordering abdominal ultrasound for elevated liver function tests.

## 2021-02-17 ENCOUNTER — HOSPITAL ENCOUNTER (OUTPATIENT)
Dept: ULTRASOUND IMAGING | Facility: CLINIC | Age: 56
Discharge: HOME | End: 2021-02-17
Attending: INTERNAL MEDICINE | Admitting: INTERNAL MEDICINE
Payer: COMMERCIAL

## 2021-02-17 DIAGNOSIS — R79.89 ELEVATED LFTS: ICD-10-CM

## 2021-02-17 PROCEDURE — 76705 ECHO EXAM OF ABDOMEN: CPT

## 2021-03-21 DIAGNOSIS — I10 ESSENTIAL HYPERTENSION: ICD-10-CM

## 2021-03-22 RX ORDER — HYDROCHLOROTHIAZIDE 12.5 MG/1
TABLET ORAL
Qty: 180 TABLET | Refills: 2 | Status: SHIPPED | OUTPATIENT
Start: 2021-03-22 | End: 2021-03-23

## 2021-03-22 NOTE — TELEPHONE ENCOUNTER
Patient is requesting remaining refills from 1/20/21 be sent to another pharmacy    Prescription approved per [facility] Refill Protocol.

## 2021-03-23 RX ORDER — HYDROCHLOROTHIAZIDE 12.5 MG/1
TABLET ORAL
Qty: 180 TABLET | Refills: 2 | Status: SHIPPED | OUTPATIENT
Start: 2021-03-23 | End: 2022-01-24

## 2021-04-07 ENCOUNTER — TRANSFERRED RECORDS (OUTPATIENT)
Dept: HEALTH INFORMATION MANAGEMENT | Facility: CLINIC | Age: 56
End: 2021-04-07

## 2021-04-24 ENCOUNTER — IMMUNIZATION (OUTPATIENT)
Dept: NURSING | Facility: CLINIC | Age: 56
End: 2021-04-24
Payer: COMMERCIAL

## 2021-04-24 PROCEDURE — 91301 PR COVID VAC MODERNA 100 MCG/0.5 ML IM: CPT

## 2021-04-24 PROCEDURE — 0011A PR COVID VAC MODERNA 100 MCG/0.5 ML IM: CPT

## 2021-05-22 ENCOUNTER — IMMUNIZATION (OUTPATIENT)
Dept: NURSING | Facility: CLINIC | Age: 56
End: 2021-05-22
Attending: INTERNAL MEDICINE
Payer: COMMERCIAL

## 2021-05-22 PROCEDURE — 91301 PR COVID VAC MODERNA 100 MCG/0.5 ML IM: CPT

## 2021-05-22 PROCEDURE — 0012A PR COVID VAC MODERNA 100 MCG/0.5 ML IM: CPT

## 2021-06-01 ENCOUNTER — OFFICE VISIT (OUTPATIENT)
Dept: INTERNAL MEDICINE | Facility: CLINIC | Age: 56
End: 2021-06-01
Payer: COMMERCIAL

## 2021-06-01 VITALS
DIASTOLIC BLOOD PRESSURE: 64 MMHG | OXYGEN SATURATION: 98 % | WEIGHT: 229 LBS | TEMPERATURE: 98.5 F | SYSTOLIC BLOOD PRESSURE: 104 MMHG | HEART RATE: 85 BPM | RESPIRATION RATE: 20 BRPM | HEIGHT: 76 IN | BODY MASS INDEX: 27.89 KG/M2

## 2021-06-01 DIAGNOSIS — H61.21 IMPACTED CERUMEN OF RIGHT EAR: ICD-10-CM

## 2021-06-01 DIAGNOSIS — H93.11 TINNITUS, RIGHT: ICD-10-CM

## 2021-06-01 DIAGNOSIS — L57.0 KERATOSIS: Primary | ICD-10-CM

## 2021-06-01 PROCEDURE — 99213 OFFICE O/P EST LOW 20 MIN: CPT | Mod: 25 | Performed by: INTERNAL MEDICINE

## 2021-06-01 PROCEDURE — 69210 REMOVE IMPACTED EAR WAX UNI: CPT | Mod: RT | Performed by: INTERNAL MEDICINE

## 2021-06-01 ASSESSMENT — MIFFLIN-ST. JEOR: SCORE: 1957.3

## 2021-06-01 NOTE — PROGRESS NOTES
Assessment & Plan     Keratosis  Reassured this is a benign keratosis, since it is unchanging and present for many years, no need to remove it.    Tinnitus, right  This may have been related to impacted earwax, his chart suggest he has had some tinnitus in the past which may be related to noise exposure many years.  Advised he could also have some intermittent eustachian tube dysfunction, can try popping exercises if he has some occasional plugging and tinnitus    Impacted cerumen of right ear  Ear wax cleared from the right ear.  - REMOVE IMPACTED CERUMEN      Return in about 8 months (around 1/21/2022) for Physical Exam.    Farhana Lamb MD  [facility] KARY Stinson is a 55 year old who presents for the following health issues     HPI     1.  He has a skin lesion at the back of his head on the right side.  It is been present since he was a teenager.  It is unchanging.  His wife was a little concerned about it wanted it checked.  2.  He has had some buzzing noise in his right ear intermittently for about 3 weeks.  He reports sometimes his ear seems to have a little pressure or plugging, wonders if there is wax present.  He does think the hearing in that ear is diminished.  He reports no episodes of vertigo.    Patient Active Problem List   Diagnosis     Herpes zoster     CARDIOVASCULAR SCREENING; LDL GOAL LESS THAN 160     Tinnitus, bilateral     CVA (cerebral vascular accident) (H)     TIA (transient ischemic attack)     Current Outpatient Medications   Medication Sig Dispense Refill     albuterol (PROAIR HFA/PROVENTIL HFA/VENTOLIN HFA) 108 (90 Base) MCG/ACT inhaler Inhale 1-2 puffs into the lungs every 6 hours as needed for wheezing or other (cough) 1 Inhaler 1     aspirin (ASA) 81 MG chewable tablet Take 1 tablet (81 mg) by mouth daily 90 tablet 3     hydrochlorothiazide (HYDRODIURIL) 12.5 MG tablet Take 2 tablets by mouth once daily 180 tablet 2         Review of Systems  "  No fever, chills, occasionally gets some mild frontal headache, no vertigo      Objective    /64 (BP Location: Right arm, Patient Position: Sitting, Cuff Size: Adult Large)   Pulse 85   Temp 98.5  F (36.9  C) (Oral)   Resp 20   Ht 1.918 m (6' 3.5\")   Wt 103.9 kg (229 lb)   SpO2 98%   BMI 28.25 kg/m    Body mass index is 28.25 kg/m .  Physical Exam     Skin: There is a small benign-appearing papule of the back of the head, no telangiectasias or scabbing.    Right ear: Moderate amount of wax, small to moderate amount was removed manually with a curette, the rest was flushed by the medical assistant.  The TM is normal.  He reports his hearing is improved after flushing the wax.              "

## 2021-06-01 NOTE — NURSING NOTE
"/64 (BP Location: Right arm, Patient Position: Sitting, Cuff Size: Adult Large)   Pulse 85   Temp 98.5  F (36.9  C) (Oral)   Resp 20   Ht 1.918 m (6' 3.5\")   Wt 103.9 kg (229 lb)   SpO2 98%   BMI 28.25 kg/m      "

## 2021-07-25 ENCOUNTER — ANCILLARY PROCEDURE (OUTPATIENT)
Dept: GENERAL RADIOLOGY | Facility: CLINIC | Age: 56
End: 2021-07-25
Attending: INTERNAL MEDICINE
Payer: COMMERCIAL

## 2021-07-25 ENCOUNTER — OFFICE VISIT (OUTPATIENT)
Dept: URGENT CARE | Facility: URGENT CARE | Age: 56
End: 2021-07-25
Payer: COMMERCIAL

## 2021-07-25 VITALS
BODY MASS INDEX: 28.25 KG/M2 | RESPIRATION RATE: 20 BRPM | OXYGEN SATURATION: 100 % | TEMPERATURE: 98.9 F | HEART RATE: 69 BPM | DIASTOLIC BLOOD PRESSURE: 80 MMHG | WEIGHT: 229 LBS | SYSTOLIC BLOOD PRESSURE: 120 MMHG

## 2021-07-25 DIAGNOSIS — S67.195A CRUSHING INJURY OF LEFT RING FINGER, INITIAL ENCOUNTER: ICD-10-CM

## 2021-07-25 DIAGNOSIS — S62.615A CLOSED DISPLACED FRACTURE OF PROXIMAL PHALANX OF LEFT RING FINGER, INITIAL ENCOUNTER: Primary | ICD-10-CM

## 2021-07-25 PROCEDURE — 99213 OFFICE O/P EST LOW 20 MIN: CPT | Performed by: INTERNAL MEDICINE

## 2021-07-25 PROCEDURE — 73140 X-RAY EXAM OF FINGER(S): CPT | Mod: LT | Performed by: RADIOLOGY

## 2021-07-25 NOTE — LETTER
[facility]  [address] [address]  [phone]      July 25, 2021    RE:  Milad Brown                                                                                                                                                       [address] [facility] [address]        To whom it may concern:    I have seen Milad Brown in the Urgent Care on 7/25/2021.  He has sustained a fracture to the fourth finger of the left hand during a work-related injury that was suffered on 7/17/2021.  He initially did not think that the injury was too severe and continued working.  Subsequently, he has developed more pain and dysfunction of the hand and today we confirmed a fracture.  Please make necessary accommodations for him to get care such that this fracture can be treated.  Thank you.        Sincerely,        Javier Barrios MD    [facility]

## 2021-07-27 ENCOUNTER — TRANSFERRED RECORDS (OUTPATIENT)
Dept: HEALTH INFORMATION MANAGEMENT | Facility: CLINIC | Age: 56
End: 2021-07-27

## 2021-08-31 ENCOUNTER — TRANSFERRED RECORDS (OUTPATIENT)
Dept: HEALTH INFORMATION MANAGEMENT | Facility: CLINIC | Age: 56
End: 2021-08-31

## 2021-09-18 ENCOUNTER — HEALTH MAINTENANCE LETTER (OUTPATIENT)
Age: 56
End: 2021-09-18

## 2022-01-11 ENCOUNTER — OFFICE VISIT (OUTPATIENT)
Dept: FAMILY MEDICINE | Facility: CLINIC | Age: 57
End: 2022-01-11
Payer: COMMERCIAL

## 2022-01-11 VITALS
HEART RATE: 79 BPM | BODY MASS INDEX: 28.49 KG/M2 | DIASTOLIC BLOOD PRESSURE: 76 MMHG | WEIGHT: 234 LBS | HEIGHT: 76 IN | TEMPERATURE: 98.5 F | RESPIRATION RATE: 16 BRPM | SYSTOLIC BLOOD PRESSURE: 132 MMHG | OXYGEN SATURATION: 96 %

## 2022-01-11 DIAGNOSIS — S43.421A SPRAIN OF RIGHT ROTATOR CUFF CAPSULE, INITIAL ENCOUNTER: ICD-10-CM

## 2022-01-11 DIAGNOSIS — Z71.1 CONCERN ABOUT URINARY TRACT DISEASE WITHOUT DIAGNOSIS: Primary | ICD-10-CM

## 2022-01-11 PROCEDURE — 99214 OFFICE O/P EST MOD 30 MIN: CPT | Performed by: FAMILY MEDICINE

## 2022-01-11 ASSESSMENT — MIFFLIN-ST. JEOR: SCORE: 1982.92

## 2022-01-11 NOTE — PROGRESS NOTES
"  Assessment & Plan     Concern about urinary tract disease without diagnosis  - physical exam unremarkable. Supportive measures reviewed. If worsening symptoms will refer to urology for possible cystoscopy.     Sprain of right rotator cuff capsule, initial encounter  - scheduled to follow up with ortho.         30 minutes spent on the date of the encounter doing chart review, history and exam, documentation and further activities per the note       BMI:   Estimated body mass index is 28.48 kg/m  as calculated from the following:    Height as of this encounter: 1.93 m (6' 4\").    Weight as of this encounter: 106.1 kg (234 lb).       See Patient Instructions    Return in about 2 months (around 3/11/2022) for Physical Exam, in person, with your primary care physician.    Amanda Mendez MD  [facility]    Liam Stinson is a 56 year old who presents for the following health issues     Arm Pain    History of Present Illness       He eats 4 or more servings of fruits and vegetables daily.He consumes 0 sweetened beverage(s) daily.He exercises with enough effort to increase his heart rate 30 to 60 minutes per day.  He exercises with enough effort to increase his heart rate 4 days per week.   He is taking medications regularly.       Genitourinary - Male  Onset/Duration: x 2 weeks  Description:   Dysuria (painful urination): no}  Hematuria (blood in urine): no  Frequency: no  Waking at night to urinate: YES  Hesitancy (delay in urine): YES  Retention (unable to empty): no  Decrease in urinary flow: no  Incontinence: no  Progression of Symptoms:  intermittent  Accompanying Signs & Symptoms:  Fever: no  Back/Flank pain: no  Urethral discharge: no  Testicle lumps/masses/pain: no  Nausea and/or vomiting: no  Abdominal pain: no  History:   History of frequent UTI s: no  History of kidney stones: no  History of hernias: no  Personal or Family history of Prostate problems: no  Sexually " "active: YES  Precipitating or alleviating factors: None  Therapies tried and outcome: none    Notes it happens when he hold his urine for too long.denies any dribbling or weak stream.   Denies any pain with urination or blood in urine.  Usually wakes up once at night to pee.   Admits he drinks a lot of water prior to bed.    Also does urinate a lot after taking his hydrochlorothiazide. Drinks about 2 liters during his work day and then more at night.             Review of Systems   Constitutional, HEENT, cardiovascular, pulmonary, GI, , musculoskeletal, neuro, skin, endocrine and psych systems are negative, except as otherwise noted.      Objective    /76 (BP Location: Right arm, Patient Position: Chair, Cuff Size: Adult Large)   Pulse 79   Temp 98.5  F (36.9  C) (Oral)   Resp 16   Ht 1.93 m (6' 4\")   Wt 106.1 kg (234 lb)   SpO2 96%   BMI 28.48 kg/m    Body mass index is 28.48 kg/m .  Physical Exam   GENERAL: healthy, alert and no distress  RECTAL (male): normal sphincter tone, no rectal masses, prostate normal size, smooth, nontender without nodules or masses  PSYCH: mentation appears normal, affect normal/bright            "

## 2022-01-11 NOTE — PATIENT INSTRUCTIONS
Avoid holding urine     Patient Education     Understanding a Rotator Cuff Tendon Tear    The rotator cuff is a group of 4 muscles and their tendons in the shoulder. The muscles are located in the front, back, and top of the shoulder joint. They each have a strong band of tissue (tendon) that attaches to the top of the upper arm bone. This helps keep the arm bone firmly in place in the socket of the shoulder joint. The muscles and tendons of the rotator cuff also help the shoulder joint with certain movements. These include reaching the arm over the head and rotating the arm.   Any one of the rotator cuff tendons can fray or tear from causes such as injury and overuse. A tear may be partial or complete. With a partial tear, some of the tendon is still intact. With a complete tear, the tendon is fully torn. Both types can cause pain and weakness. Arm and should movement also may be limited. A rotator cuff tear often needs treatment to heal properly.   Causes of a rotator cuff tendon tear  Causes can include:    Wear and tear of the tendons from normal use over time or from aging    Overuse of the tendons from sports or work activities, especially those that involve repeated overhead movements    Injury to the tendons from a fall or other accident  Symptoms of a rotator cuff tendon tear  Some people with a rotator cuff tendon tear have few or no symptoms. Others may have symptoms that range from mild to severe. Possible symptoms include:     Pain in your shoulder, which may be worse with overhead movements or at night from lying on the affected side    Weakness in your arm and shoulder    Trouble lifting up your arm or rotating it    Clicking or crackling sounds when moving or using your arm and shoulder  Treating a rotator cuff tendon tear  Treatment for a rotator cuff tendon tear depends on several factors. These include how severe the tear is and your symptoms. Treatment may include:     Resting your arm and  shoulder. This involves limiting certain movements, such as reaching above your head or lifting up your arm. These can slow healing and worsen symptoms. You may also need to avoid certain sports and types of work for a time.    Cold packs or heat packs. These help reduce pain and swelling.    Prescription or over-the-counter medicines  These help reduce pain and swelling. NSAIDs (nonsteroidal anti-inflammatory drugs) are the most common medicines used. They may be taken as pills. Or they may be put on the skin as a gel, cream, or patch.    Injections of medicine into your shoulder. These help relieve pain and swelling for a time. The medicine is usually a corticosteroid. This is a strong medicine that helps ease inflammation .    Physical therapy and exercises.  These help improve strength, flexibility, and range of motion in your arm and shoulder.     Surgery. Surgery may be needed if your tendon is completely torn or if other treatments don t relieve your symptoms. Different options are available. In many cases, the damaged tendon is repairedand is reattached to your arm bone.  Possible complications    If a partial tear isn t given time to heal, it may get larger or tear completely. You may then need more treatment.    Even with treatment, a partial or complete tear may sometimes have trouble healing. The problem may become long-term (chronic). This can cause ongoing pain, weakness, and limited movement of your arm and shoulder.    When to call your healthcare provider  Call your healthcare provider right away if you have any of these:    Fever of 100.4 F (38 C) or higher, or as advised by your healthcare provider    Chills    Symptoms that don t get better with treatment, or get worse    Redness, warmth, swelling, bleeding or drainage at the incision site after surgery    New symptoms  Ash last reviewed this educational content on 6/1/2019 2000-2021 [employer]. All rights reserved. This  information is not intended as a substitute for professional medical care. Always follow your healthcare professional's instructions.

## 2022-01-22 DIAGNOSIS — I10 ESSENTIAL HYPERTENSION: ICD-10-CM

## 2022-01-24 RX ORDER — HYDROCHLOROTHIAZIDE 12.5 MG/1
TABLET ORAL
Qty: 180 TABLET | Refills: 0 | Status: SHIPPED | OUTPATIENT
Start: 2022-01-24 | End: 2022-05-05

## 2022-02-01 ENCOUNTER — ANCILLARY PROCEDURE (OUTPATIENT)
Dept: GENERAL RADIOLOGY | Facility: CLINIC | Age: 57
End: 2022-02-01
Attending: FAMILY MEDICINE
Payer: COMMERCIAL

## 2022-02-01 ENCOUNTER — MYC REFILL (OUTPATIENT)
Dept: INTERNAL MEDICINE | Facility: CLINIC | Age: 57
End: 2022-02-01

## 2022-02-01 ENCOUNTER — OFFICE VISIT (OUTPATIENT)
Dept: ORTHOPEDICS | Facility: CLINIC | Age: 57
End: 2022-02-01
Payer: COMMERCIAL

## 2022-02-01 VITALS
HEIGHT: 76 IN | DIASTOLIC BLOOD PRESSURE: 92 MMHG | BODY MASS INDEX: 28.91 KG/M2 | SYSTOLIC BLOOD PRESSURE: 142 MMHG | WEIGHT: 237.4 LBS

## 2022-02-01 DIAGNOSIS — S56.911A STRAIN OF RIGHT ELBOW, INITIAL ENCOUNTER: ICD-10-CM

## 2022-02-01 DIAGNOSIS — S46.011A STRAIN OF RIGHT ROTATOR CUFF CAPSULE, INITIAL ENCOUNTER: ICD-10-CM

## 2022-02-01 DIAGNOSIS — M25.511 ACUTE PAIN OF RIGHT SHOULDER: Primary | ICD-10-CM

## 2022-02-01 DIAGNOSIS — M25.511 RIGHT SHOULDER PAIN: ICD-10-CM

## 2022-02-01 DIAGNOSIS — S56.912A STRAIN OF LEFT ELBOW, INITIAL ENCOUNTER: ICD-10-CM

## 2022-02-01 DIAGNOSIS — I10 ESSENTIAL HYPERTENSION: ICD-10-CM

## 2022-02-01 PROCEDURE — 99204 OFFICE O/P NEW MOD 45 MIN: CPT | Performed by: FAMILY MEDICINE

## 2022-02-01 PROCEDURE — 73030 X-RAY EXAM OF SHOULDER: CPT | Mod: RT | Performed by: RADIOLOGY

## 2022-02-01 RX ORDER — HYDROCHLOROTHIAZIDE 12.5 MG/1
TABLET ORAL
Qty: 180 TABLET | Refills: 0 | OUTPATIENT
Start: 2022-02-01

## 2022-02-01 ASSESSMENT — MIFFLIN-ST. JEOR: SCORE: 1998.34

## 2022-02-01 NOTE — PATIENT INSTRUCTIONS
1. Acute pain of right shoulder    2. Strain of right rotator cuff capsule, initial encounter    3. Strain of left elbow, initial encounter    4. Strain of right elbow, initial encounter      -Patient has acute right shoulder pain and bilateral elbow pain due to muscle strains from repetitive heavy lifting  -Patient has full range of motion of all body parts and full strength on exam today  -Patient may continue to work without restrictions  -Patient may continue with over-the-counter pain medications as needed.  -Patient was given home exercise program for his shoulders and elbows.  -Patient will follow up or call us with any questions or concerns  -Call direct clinic number [[phone]] at any time with questions or concerns.    Albert Yeo MD [facility]  [facility] [place]

## 2022-02-01 NOTE — PROGRESS NOTES
ASSESSMENT & PLAN  Patient Instructions     1. Acute pain of right shoulder    2. Strain of right rotator cuff capsule, initial encounter    3. Strain of left elbow, initial encounter    4. Strain of right elbow, initial encounter      -Patient has acute right shoulder pain and bilateral elbow pain due to muscle strains from repetitive heavy lifting  -Patient has full range of motion of all body parts and full strength on exam today  -Patient may continue to work without restrictions  -Patient may continue with over-the-counter pain medications as needed.  -Patient was given home exercise program for his shoulders and elbows.  -Patient will follow up or call us with any questions or concerns  -Call direct clinic number [[phone]] at any time with questions or concerns.    Albert Yeo MD [facility]  [facility]          -----    SUBJECTIVE  Milad Brown is a/an 56 year old Left handed male who is seen as a self referral for evaluation of right shoulder pain. The patient is seen by themselves.  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 Reports insidious onset without acute precipitating event.  Location of Pain: right lateral shoulder, non radiating   Rating of Pain at worst: 5/10  Rating of Pain Currently: 2/10  Worsened by: pulling, pushing, reaching above his head   Better with: Tylenol  Treatments tried: Tylenol, at home stretching   Associated symptoms: no distal numbness or tingling; denies swelling or warmth  Orthopedic history: NO  Relevant surgical history: NO  Social history: social history: works in waste management     No past medical history on file.  Social History     Socioeconomic History     Marital status:      Spouse name: Not on file     Number of children: 2     Years of education: Not on file     Highest education level:  "Not on file   Occupational History     Not on file   Tobacco Use     Smoking status: Never Smoker     Smokeless tobacco: Never Used   Vaping Use     Vaping Use: Never used   Substance and Sexual Activity     Alcohol use: No     Drug use: No     Sexual activity: Yes     Partners: Female   Other Topics Concern     Parent/sibling w/ CABG, MI or angioplasty before 65F 55M? Not Asked   Social History Narrative     Not on file     Social Determinants of Health     Financial Resource Strain: Not on file   Food Insecurity: Not on file   Transportation Needs: Not on file   Physical Activity: Not on file   Stress: Not on file   Social Connections: Not on file   Intimate Partner Violence: Not on file   Housing Stability: Not on file         Patient's past medical, surgical, social, and family histories were reviewed today and no changes are noted.    REVIEW OF SYSTEMS:  10 point ROS is negative other than symptoms noted above in HPI, Past Medical History or as stated below  Constitutional: NEGATIVE for fever, chills, change in weight  Skin: NEGATIVE for worrisome rashes, moles or lesions  GI/: NEGATIVE for bowel or bladder changes  Neuro: NEGATIVE for weakness, dizziness or paresthesias    OBJECTIVE:  BP (!) 142/92   Ht 1.93 m (6' 4\")   Wt 107.7 kg (237 lb 6.4 oz)   BMI 28.90 kg/m     General: healthy, alert and in no distress  HEENT: no scleral icterus or conjunctival erythema  Skin: no suspicious lesions or rash. No jaundice.  CV: regular rhythm by palpation  Resp: normal respiratory effort without conversational dyspnea   Psych: normal mood and affect  Gait: normal steady gait with appropriate coordination and balance  Neuro: normal light touch sensory exam of the bilateral upper extremities.    MSK:  RIGHT SHOULDER  Inspection:    no swelling, bruising, discoloration, or obvious deformity or asymmetry  Palpation:    Tender about the supraspinatus insertion. Remainder of bony and tendinous landmarks are " nontender.  Active Range of Motion:     Abduction normal0, FF normal0, ER normal0, IR normal.      Scapular dyskinesis absent  Strength:    Scapular plane abduction grossly intact,  ER grossly intact, IR grossly intact, biceps grossly intact, triceps grossly intact  Special Tests:    Positive: none    Negative: Neer's, Kelsey', supraspinatus (empty can), drop arm/painful arc, crossed arm adduction, [place]'s, Speed's and Yergason's    BILATERAL ELBOW  Inspection:    No swelling, bruising, discoloration, or obvious deformity or asymmetry  Palpation:  bony, ligamentous and tendinous landmarks are nontender.    Crepitus is Absent  Range of Motion:     Extension full / flexion full / pronation full / supination full  Strength:    No deficits in flexion, extension, pronation, or supination.  Special Tests:    Positive: none    Negative: Pain with resisted wrist extension, pain with resisted middle finger extension, pain with resisted wrist flexion, pain with resisted supination      Independent visualization of the below image:  No results found for this or any previous visit (from the past 24 hour(s)).    Xray of the right shoulder taken in office today shows no acute fracture, dislocation or osseous abnormalities.    Albert Yeo MD [facility]

## 2022-02-01 NOTE — LETTER
2/1/2022         RE: Milad Brown  [address]  [place] [address]        Dear Colleague,    Thank you for referring your patient, Milad Brown, to the [facility] [place]. Please see a copy of my visit note below.    ASSESSMENT & PLAN  Patient Instructions     1. Acute pain of right shoulder    2. Strain of right rotator cuff capsule, initial encounter    3. Strain of left elbow, initial encounter    4. Strain of right elbow, initial encounter      -Patient has acute right shoulder pain and bilateral elbow pain due to muscle strains from repetitive heavy lifting  -Patient has full range of motion of all body parts and full strength on exam today  -Patient may continue to work without restrictions  -Patient may continue with over-the-counter pain medications as needed.  -Patient was given home exercise program for his shoulders and elbows.  -Patient will follow up or call us with any questions or concerns  -Call direct clinic number [[phone]] at any time with questions or concerns.    Albert Yeo MD [facility]  [facility] [place]          -----    SUBJECTIVE  Milad Brown is a/an 56 year old Left handed male who is seen as a self referral for evaluation of right shoulder pain. The patient is seen by themselves.  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 Reports insidious onset without acute precipitating event.  Location of Pain: right lateral shoulder, non radiating   Rating of Pain at worst: 5/10  Rating of Pain Currently: 2/10  Worsened by: pulling, pushing, reaching above his head   Better with: Tylenol  Treatments tried: Tylenol, at home stretching   Associated symptoms: no distal numbness or tingling; denies swelling or warmth  Orthopedic history: NO  Relevant surgical history: NO  Social  "history: social history: works in waste management     No past medical history on file.  Social History     Socioeconomic History     Marital status:      Spouse name: Not on file     Number of children: 2     Years of education: Not on file     Highest education level: Not on file   Occupational History     Not on file   Tobacco Use     Smoking status: Never Smoker     Smokeless tobacco: Never Used   Vaping Use     Vaping Use: Never used   Substance and Sexual Activity     Alcohol use: No     Drug use: No     Sexual activity: Yes     Partners: Female   Other Topics Concern     Parent/sibling w/ CABG, MI or angioplasty before 65F 55M? Not Asked   Social History Narrative     Not on file     Social Determinants of Health     Financial Resource Strain: Not on file   Food Insecurity: Not on file   Transportation Needs: Not on file   Physical Activity: Not on file   Stress: Not on file   Social Connections: Not on file   Intimate Partner Violence: Not on file   Housing Stability: Not on file         Patient's past medical, surgical, social, and family histories were reviewed today and no changes are noted.    REVIEW OF SYSTEMS:  10 point ROS is negative other than symptoms noted above in HPI, Past Medical History or as stated below  Constitutional: NEGATIVE for fever, chills, change in weight  Skin: NEGATIVE for worrisome rashes, moles or lesions  GI/: NEGATIVE for bowel or bladder changes  Neuro: NEGATIVE for weakness, dizziness or paresthesias    OBJECTIVE:  BP (!) 142/92   Ht 1.93 m (6' 4\")   Wt 107.7 kg (237 lb 6.4 oz)   BMI 28.90 kg/m     General: healthy, alert and in no distress  HEENT: no scleral icterus or conjunctival erythema  Skin: no suspicious lesions or rash. No jaundice.  CV: regular rhythm by palpation  Resp: normal respiratory effort without conversational dyspnea   Psych: normal mood and affect  Gait: normal steady gait with appropriate coordination and balance  Neuro: normal light touch " sensory exam of the bilateral upper extremities.    MSK:  RIGHT SHOULDER  Inspection:    no swelling, bruising, discoloration, or obvious deformity or asymmetry  Palpation:    Tender about the supraspinatus insertion. Remainder of bony and tendinous landmarks are nontender.  Active Range of Motion:     Abduction normal0, FF normal0, ER normal0, IR normal.      Scapular dyskinesis absent  Strength:    Scapular plane abduction grossly intact,  ER grossly intact, IR grossly intact, biceps grossly intact, triceps grossly intact  Special Tests:    Positive: none    Negative: Neer's, Kelsey', supraspinatus (empty can), drop arm/painful arc, crossed arm adduction, [place]'s, Speed's and Yergason's    BILATERAL ELBOW  Inspection:    No swelling, bruising, discoloration, or obvious deformity or asymmetry  Palpation:  bony, ligamentous and tendinous landmarks are nontender.    Crepitus is Absent  Range of Motion:     Extension full / flexion full / pronation full / supination full  Strength:    No deficits in flexion, extension, pronation, or supination.  Special Tests:    Positive: none    Negative: Pain with resisted wrist extension, pain with resisted middle finger extension, pain with resisted wrist flexion, pain with resisted supination      Independent visualization of the below image:  No results found for this or any previous visit (from the past 24 hour(s)).    Xray of the right shoulder taken in office today shows no acute fracture, dislocation or osseous abnormalities.    Albert Yeo MD [facility]        Again, thank you for allowing me to participate in the care of your patient.        Sincerely,        Albert Yeo, MD

## 2022-02-01 NOTE — TELEPHONE ENCOUNTER
Refused Prescriptions:                       Disp   Refills    hydrochlorothiazide (HYDRODIURIL) 12.5 MG *180 ta*0        Sig: TAKE 2 TABLETS(25 MG) BY MOUTH DAILY      Duplicate.   Patient received 180 tablets with 0 refills on 1/24/2022.    Maria Ines WILSON RN   [facility]

## 2022-03-05 ENCOUNTER — HEALTH MAINTENANCE LETTER (OUTPATIENT)
Age: 57
End: 2022-03-05

## 2022-04-13 ENCOUNTER — TRANSFERRED RECORDS (OUTPATIENT)
Dept: HEALTH INFORMATION MANAGEMENT | Facility: CLINIC | Age: 57
End: 2022-04-13
Payer: COMMERCIAL

## 2022-05-03 SDOH — ECONOMIC STABILITY: INCOME INSECURITY: HOW HARD IS IT FOR YOU TO PAY FOR THE VERY BASICS LIKE FOOD, HOUSING, MEDICAL CARE, AND HEATING?: NOT HARD AT ALL

## 2022-05-03 SDOH — ECONOMIC STABILITY: INCOME INSECURITY: IN THE LAST 12 MONTHS, WAS THERE A TIME WHEN YOU WERE NOT ABLE TO PAY THE MORTGAGE OR RENT ON TIME?: NO

## 2022-05-03 SDOH — HEALTH STABILITY: PHYSICAL HEALTH: ON AVERAGE, HOW MANY DAYS PER WEEK DO YOU ENGAGE IN MODERATE TO STRENUOUS EXERCISE (LIKE A BRISK WALK)?: 5 DAYS

## 2022-05-03 SDOH — ECONOMIC STABILITY: FOOD INSECURITY: WITHIN THE PAST 12 MONTHS, YOU WORRIED THAT YOUR FOOD WOULD RUN OUT BEFORE YOU GOT MONEY TO BUY MORE.: NEVER TRUE

## 2022-05-03 SDOH — HEALTH STABILITY: PHYSICAL HEALTH: ON AVERAGE, HOW MANY MINUTES DO YOU ENGAGE IN EXERCISE AT THIS LEVEL?: 60 MIN

## 2022-05-03 SDOH — ECONOMIC STABILITY: TRANSPORTATION INSECURITY
IN THE PAST 12 MONTHS, HAS THE LACK OF TRANSPORTATION KEPT YOU FROM MEDICAL APPOINTMENTS OR FROM GETTING MEDICATIONS?: NO

## 2022-05-03 SDOH — ECONOMIC STABILITY: FOOD INSECURITY: WITHIN THE PAST 12 MONTHS, THE FOOD YOU BOUGHT JUST DIDN'T LAST AND YOU DIDN'T HAVE MONEY TO GET MORE.: NEVER TRUE

## 2022-05-03 SDOH — ECONOMIC STABILITY: TRANSPORTATION INSECURITY
IN THE PAST 12 MONTHS, HAS LACK OF TRANSPORTATION KEPT YOU FROM MEETINGS, WORK, OR FROM GETTING THINGS NEEDED FOR DAILY LIVING?: NO

## 2022-05-03 ASSESSMENT — LIFESTYLE VARIABLES
HOW OFTEN DO YOU HAVE SIX OR MORE DRINKS ON ONE OCCASION: NEVER
HOW MANY STANDARD DRINKS CONTAINING ALCOHOL DO YOU HAVE ON A TYPICAL DAY: PATIENT DOES NOT DRINK
AUDIT-C TOTAL SCORE: 0
HOW OFTEN DO YOU HAVE A DRINK CONTAINING ALCOHOL: NEVER
SKIP TO QUESTIONS 9-10: 1

## 2022-05-03 ASSESSMENT — ENCOUNTER SYMPTOMS
DIZZINESS: 0
PALPITATIONS: 0
DYSURIA: 0
HEARTBURN: 0
ARTHRALGIAS: 0
HEMATOCHEZIA: 0
NAUSEA: 0
FREQUENCY: 0
WEAKNESS: 0
PARESTHESIAS: 0
SHORTNESS OF BREATH: 0
JOINT SWELLING: 0
DIARRHEA: 0
CONSTIPATION: 0
CHILLS: 0
COUGH: 0
ABDOMINAL PAIN: 0
EYE PAIN: 0
FEVER: 0
HEMATURIA: 0
HEADACHES: 0
SORE THROAT: 0
NERVOUS/ANXIOUS: 0
MYALGIAS: 0

## 2022-05-03 ASSESSMENT — SOCIAL DETERMINANTS OF HEALTH (SDOH)
DO YOU BELONG TO ANY CLUBS OR ORGANIZATIONS SUCH AS CHURCH GROUPS UNIONS, FRATERNAL OR ATHLETIC GROUPS, OR SCHOOL GROUPS?: NO
IN A TYPICAL WEEK, HOW MANY TIMES DO YOU TALK ON THE PHONE WITH FAMILY, FRIENDS, OR NEIGHBORS?: MORE THAN THREE TIMES A WEEK
HOW OFTEN DO YOU ATTEND CHURCH OR RELIGIOUS SERVICES?: PATIENT DECLINED
HOW OFTEN DO YOU GET TOGETHER WITH FRIENDS OR RELATIVES?: ONCE A WEEK

## 2022-05-05 ENCOUNTER — OFFICE VISIT (OUTPATIENT)
Dept: FAMILY MEDICINE | Facility: CLINIC | Age: 57
End: 2022-05-05
Payer: COMMERCIAL

## 2022-05-05 VITALS
SYSTOLIC BLOOD PRESSURE: 125 MMHG | HEART RATE: 72 BPM | BODY MASS INDEX: 28.37 KG/M2 | RESPIRATION RATE: 16 BRPM | HEIGHT: 76 IN | DIASTOLIC BLOOD PRESSURE: 76 MMHG | TEMPERATURE: 98.2 F | WEIGHT: 233 LBS | OXYGEN SATURATION: 98 %

## 2022-05-05 DIAGNOSIS — I10 ESSENTIAL HYPERTENSION: ICD-10-CM

## 2022-05-05 DIAGNOSIS — R73.03 PREDIABETES: ICD-10-CM

## 2022-05-05 DIAGNOSIS — Z00.00 ROUTINE GENERAL MEDICAL EXAMINATION AT A HEALTH CARE FACILITY: Primary | ICD-10-CM

## 2022-05-05 DIAGNOSIS — Z12.5 SCREENING FOR PROSTATE CANCER: ICD-10-CM

## 2022-05-05 PROBLEM — I63.9 CVA (CEREBRAL VASCULAR ACCIDENT) (H): Status: RESOLVED | Noted: 2019-01-24 | Resolved: 2022-05-05

## 2022-05-05 LAB
BASOPHILS # BLD AUTO: 0 10E3/UL (ref 0–0.2)
BASOPHILS NFR BLD AUTO: 0 %
EOSINOPHIL # BLD AUTO: 0.1 10E3/UL (ref 0–0.7)
EOSINOPHIL NFR BLD AUTO: 2 %
ERYTHROCYTE [DISTWIDTH] IN BLOOD BY AUTOMATED COUNT: 13.7 % (ref 10–15)
HBA1C MFR BLD: 6.2 % (ref 0–5.6)
HCT VFR BLD AUTO: 42.7 % (ref 40–53)
HGB BLD-MCNC: 13.8 G/DL (ref 13.3–17.7)
LYMPHOCYTES # BLD AUTO: 2.3 10E3/UL (ref 0.8–5.3)
LYMPHOCYTES NFR BLD AUTO: 50 %
MCH RBC QN AUTO: 27.7 PG (ref 26.5–33)
MCHC RBC AUTO-ENTMCNC: 32.3 G/DL (ref 31.5–36.5)
MCV RBC AUTO: 86 FL (ref 78–100)
MONOCYTES # BLD AUTO: 0.5 10E3/UL (ref 0–1.3)
MONOCYTES NFR BLD AUTO: 10 %
NEUTROPHILS # BLD AUTO: 1.8 10E3/UL (ref 1.6–8.3)
NEUTROPHILS NFR BLD AUTO: 38 %
PLATELET # BLD AUTO: 207 10E3/UL (ref 150–450)
RBC # BLD AUTO: 4.99 10E6/UL (ref 4.4–5.9)
WBC # BLD AUTO: 4.7 10E3/UL (ref 4–11)

## 2022-05-05 PROCEDURE — G0103 PSA SCREENING: HCPCS | Performed by: FAMILY MEDICINE

## 2022-05-05 PROCEDURE — 80053 COMPREHEN METABOLIC PANEL: CPT | Performed by: FAMILY MEDICINE

## 2022-05-05 PROCEDURE — 83036 HEMOGLOBIN GLYCOSYLATED A1C: CPT | Performed by: FAMILY MEDICINE

## 2022-05-05 PROCEDURE — 85025 COMPLETE CBC W/AUTO DIFF WBC: CPT | Performed by: FAMILY MEDICINE

## 2022-05-05 PROCEDURE — 36415 COLL VENOUS BLD VENIPUNCTURE: CPT | Performed by: FAMILY MEDICINE

## 2022-05-05 PROCEDURE — 99396 PREV VISIT EST AGE 40-64: CPT | Performed by: FAMILY MEDICINE

## 2022-05-05 PROCEDURE — 80061 LIPID PANEL: CPT | Performed by: FAMILY MEDICINE

## 2022-05-05 RX ORDER — HYDROCHLOROTHIAZIDE 25 MG/1
TABLET ORAL
Qty: 90 TABLET | Refills: 3 | Status: SHIPPED | OUTPATIENT
Start: 2022-05-05 | End: 2023-05-19

## 2022-05-05 ASSESSMENT — ENCOUNTER SYMPTOMS
ARTHRALGIAS: 0
WEAKNESS: 0
NAUSEA: 0
DYSURIA: 0
PALPITATIONS: 0
HEARTBURN: 0
SORE THROAT: 0
CHILLS: 0
EYE PAIN: 0
HEMATOCHEZIA: 0
HEADACHES: 0
MYALGIAS: 0
HEMATURIA: 0
DIARRHEA: 0
PARESTHESIAS: 0
FREQUENCY: 0
DIZZINESS: 0
COUGH: 0
CONSTIPATION: 0
FEVER: 0
JOINT SWELLING: 0
NERVOUS/ANXIOUS: 0
SHORTNESS OF BREATH: 0
ABDOMINAL PAIN: 0

## 2022-05-05 NOTE — PROGRESS NOTES
SUBJECTIVE:   CC: Milad Brown is an 56 year old male who presents for preventative health visit.         Healthy Habits:     Getting at least 3 servings of Calcium per day:  Yes    Bi-annual eye exam:  Yes    Dental care twice a year:  Yes    Sleep apnea or symptoms of sleep apnea:  Sleep apnea    Diet:  Regular (no restrictions)    Frequency of exercise:  4-5 days/week    Duration of exercise:  15-30 minutes    Taking medications regularly:  Yes    Medication side effects:  None    PHQ-2 Total Score: 0    Additional concerns today:  No    Wt Readings from Last 4 Encounters:   05/05/22 105.7 kg (233 lb)   02/01/22 107.7 kg (237 lb 6.4 oz)   01/11/22 106.1 kg (234 lb)   07/25/21 103.9 kg (229 lb)       Today's PHQ-2 Score:   PHQ-2 ( 1999 [employer]) 5/3/2022   Q1: Little interest or pleasure in doing things 0   Q2: Feeling down, depressed or hopeless 0   PHQ-2 Score 0   PHQ-2 Total Score (12-17 Years)- Positive if 3 or more points; Administer PHQ-A if positive -   Q1: Little interest or pleasure in doing things Not at all   Q2: Feeling down, depressed or hopeless Not at all   PHQ-2 Score 0       Abuse: Current or Past(Physical, Sexual or Emotional)- No  Do you feel safe in your environment? Yes        Social History     Tobacco Use     Smoking status: Never Smoker     Smokeless tobacco: Never Used   Substance Use Topics     Alcohol use: No         Alcohol Use 5/3/2022   Prescreen: >3 drinks/day or >7 drinks/week? No   Prescreen: >3 drinks/day or >7 drinks/week? -       Last PSA:   PSA   Date Value Ref Range Status   02/06/2021 1.04 0 - 4 ug/L Final     Comment:     Assay Method:  Chemiluminescence using Siemens Vista analyzer       Reviewed orders with patient. Reviewed health maintenance and updated orders accordingly - Yes  Labs reviewed in EPIC    Reviewed and updated as needed this visit by clinical staff   Tobacco  Allergies    Med Hx  Surg Hx  Fam Hx  Soc Hx          Reviewed and updated as needed this  "visit by Provider                       Review of Systems   Constitutional: Negative for chills and fever.   HENT: Negative for congestion, ear pain, hearing loss and sore throat.    Eyes: Negative for pain and visual disturbance.   Respiratory: Negative for cough and shortness of breath.    Cardiovascular: Negative for chest pain, palpitations and peripheral edema.   Gastrointestinal: Negative for abdominal pain, constipation, diarrhea, heartburn, hematochezia and nausea.   Genitourinary: Negative for dysuria, frequency, genital sores, hematuria, impotence, penile discharge and urgency.   Musculoskeletal: Negative for arthralgias, joint swelling and myalgias.   Skin: Negative for rash.   Neurological: Negative for dizziness, weakness, headaches and paresthesias.   Psychiatric/Behavioral: Negative for mood changes. The patient is not nervous/anxious.          OBJECTIVE:   /76 (BP Location: Right arm, Patient Position: Sitting, Cuff Size: Adult Large)   Pulse 72   Temp 98.2  F (36.8  C) (Oral)   Resp 16   Ht 1.93 m (6' 4\")   Wt 105.7 kg (233 lb)   SpO2 98%   BMI 28.36 kg/m      Physical Exam  GENERAL: healthy, alert and no distress  EYES: Eyes grossly normal to inspection, PERRL and conjunctivae and sclerae normal  HENT: ear canals and TM's normal, nose and mouth without ulcers or lesions  NECK: no adenopathy, no asymmetry, masses, or scars and thyroid normal to palpation  RESP: lungs clear to auscultation - no rales, rhonchi or wheezes  CV: regular rate and rhythm, normal S1 S2, no S3 or S4, no murmur, click or rub, no peripheral edema and peripheral pulses strong  ABDOMEN: soft, nontender, no hepatosplenomegaly, no masses and bowel sounds normal  MS: no gross musculoskeletal defects noted, no edema  SKIN: no suspicious lesions or rashes  NEURO: Normal strength and tone, mentation intact and speech normal  PSYCH: mentation appears normal, affect normal/bright    Diagnostic Test Results:  Labs reviewed " "in Epic  none     ASSESSMENT/PLAN:   (Z00.00) Routine general medical examination at a health care facility  (primary encounter diagnosis)  Plan: CBC with platelets and differential, Lipid         panel reflex to direct LDL Non-fasting,         Comprehensive metabolic panel (BMP + Alb, Alk         Phos, ALT, AST, Total. Bili, TP)      (R73.03) Prediabetes  Plan: Hemoglobin A1c    (Z12.5) Screening for prostate cancer  Plan: PSA, screen      (I10) Essential hypertension  Comment: Rx changed from HCZT 12.5 mg (2 tabs daily) to 1 tablet of hydrochlorothiazide 25 mg  Plan: hydrochlorothiazide (HYDRODIURIL) 25 MG tablet        Patient has been advised of split billing requirements and indicates understanding: Yes    COUNSELING:   Reviewed preventive health counseling, as reflected in patient instructions       Regular exercise       Healthy diet/nutrition    Estimated body mass index is 28.36 kg/m  as calculated from the following:    Height as of this encounter: 1.93 m (6' 4\").    Weight as of this encounter: 105.7 kg (233 lb).     Weight management plan: Discussed healthy diet and exercise guidelines    He reports that he has never smoked. He has never used smokeless tobacco.      Counseling Resources:  ATP IV Guidelines  Pooled Cohorts Equation Calculator  FRAX Risk Assessment  ICSI Preventive Guidelines  Dietary Guidelines for Americans, 2010  USDA's MyPlate  ASA Prophylaxis  Lung CA Screening    Amanda Mendez MD  [facility]  "

## 2022-05-06 LAB
ALBUMIN SERPL-MCNC: 3.9 G/DL (ref 3.4–5)
ALP SERPL-CCNC: 80 U/L (ref 40–150)
ALT SERPL W P-5'-P-CCNC: 96 U/L (ref 0–70)
ANION GAP SERPL CALCULATED.3IONS-SCNC: 2 MMOL/L (ref 3–14)
AST SERPL W P-5'-P-CCNC: 43 U/L (ref 0–45)
BILIRUB SERPL-MCNC: 0.3 MG/DL (ref 0.2–1.3)
BUN SERPL-MCNC: 16 MG/DL (ref 7–30)
CALCIUM SERPL-MCNC: 9.6 MG/DL (ref 8.5–10.1)
CHLORIDE BLD-SCNC: 107 MMOL/L (ref 94–109)
CHOLEST SERPL-MCNC: 183 MG/DL
CO2 SERPL-SCNC: 31 MMOL/L (ref 20–32)
CREAT SERPL-MCNC: 1.07 MG/DL (ref 0.66–1.25)
FASTING STATUS PATIENT QL REPORTED: YES
GFR SERPL CREATININE-BSD FRML MDRD: 80 ML/MIN/1.73M2
GLUCOSE BLD-MCNC: 98 MG/DL (ref 70–99)
HDLC SERPL-MCNC: 78 MG/DL
LDLC SERPL CALC-MCNC: 90 MG/DL
NONHDLC SERPL-MCNC: 105 MG/DL
POTASSIUM BLD-SCNC: 4.4 MMOL/L (ref 3.4–5.3)
PROT SERPL-MCNC: 7.5 G/DL (ref 6.8–8.8)
PSA SERPL-MCNC: 1.24 UG/L (ref 0–4)
SODIUM SERPL-SCNC: 140 MMOL/L (ref 133–144)
TRIGL SERPL-MCNC: 74 MG/DL

## 2022-10-28 NOTE — PROGRESS NOTES
Assessment & Plan     Closed displaced fracture of proximal phalanx of left ring finger, initial encounter  The finger is splinted and the patient is referred.  Given the displacement of the fracture fragment, this will need some more advanced reduction in order to achieve optimal healing and functionality.  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  He has continued to work with this.  The force came in on the proximal phalanx with some hyperextension.  He has noted ongoing swelling of the proximal phalanx. He has some capacity for flexion/extension but not complete.  He notes that pain has been increasing over the past week.           Objective    /80   Pulse 69   Temp 98.9  F (37.2  C)   Resp 20   Wt 103.9 kg (229 lb)   SpO2 100%   BMI 28.25 kg/m    Body mass index is 28.25 kg/m .  Physical Exam   GENERAL APPEARANCE: healthy, alert and no distress  LEFT HAND: there is swelling of the proximal portion of the left fourth finger with diminished ROM on flexion/extension at both the MCP joint and the PIP joint    Results for orders placed or performed in visit on 07/25/21 (from the past 24 hour(s))   XR Finger Left G/E 2 Views    Narrative    XR FINGER LEFT G/E 2 VIEWS   7/25/2021 9:51 AM     HISTORY: Crushing and hyperextension injury to the proximal phalanx of  left 4th finger two weeks ago; continued swelling and pain; r/o  fracture; Crushing injury of left ring finger, initial encounter  COMPARISON: None.       Impression    IMPRESSION: Fractures through the distal shaft of the proximal phalanx  of the ring finger with dorsal displacement of the distal fragment by  approximately 3 mm mild apex volar angulation. Adjacent soft  tissue  swelling. Small incidental exostosis middle phalanx ring finger.    BERNICE CLINE MD         SYSTEM ID:  [id number]              normal

## 2022-11-19 ENCOUNTER — HEALTH MAINTENANCE LETTER (OUTPATIENT)
Age: 57
End: 2022-11-19

## 2022-11-21 ENCOUNTER — MYC MEDICAL ADVICE (OUTPATIENT)
Dept: FAMILY MEDICINE | Facility: CLINIC | Age: 57
End: 2022-11-21

## 2022-11-21 DIAGNOSIS — Z00.00 ROUTINE GENERAL MEDICAL EXAMINATION AT A HEALTH CARE FACILITY: Primary | ICD-10-CM

## 2022-11-21 DIAGNOSIS — R73.03 PREDIABETES: ICD-10-CM

## 2022-11-22 NOTE — TELEPHONE ENCOUNTER
See patient's MyChart message     - Patient requesting to have the following lab tests completed: Hemoglobin A1c, CMP, CBC with platelets and differential, PSA Screen, and Lipid Panel to Direct LDL   - Pended Hemoglobin A1c, CMP, CBC with platelets and differential, PSA Screen, and Lipid Panel to Direct LDL labs   - Zuleimanet's last labs were completed on 5/5/2022 (CMP, PSA screen, Hemoglobin A1c, Lipid panel reflex to direct LDL non-fasting, and CBC with platelets and differential)     Dr. Mendez, please review, advise, and order as appropriate.     Ruthie ROJAS RN   Patient Advocate Liaison (PAL)  [facility]

## 2022-12-22 ENCOUNTER — LAB (OUTPATIENT)
Dept: LAB | Facility: CLINIC | Age: 57
End: 2022-12-22
Payer: COMMERCIAL

## 2022-12-22 DIAGNOSIS — Z11.4 SCREENING FOR HIV (HUMAN IMMUNODEFICIENCY VIRUS): Primary | ICD-10-CM

## 2022-12-22 DIAGNOSIS — R73.03 PREDIABETES: ICD-10-CM

## 2022-12-22 LAB — HBA1C MFR BLD: 6.2 % (ref 0–5.6)

## 2022-12-22 PROCEDURE — 83036 HEMOGLOBIN GLYCOSYLATED A1C: CPT

## 2022-12-22 PROCEDURE — 36415 COLL VENOUS BLD VENIPUNCTURE: CPT

## 2022-12-22 PROCEDURE — 87389 HIV-1 AG W/HIV-1&-2 AB AG IA: CPT

## 2022-12-23 ENCOUNTER — TELEPHONE (OUTPATIENT)
Dept: FAMILY MEDICINE | Facility: CLINIC | Age: 57
End: 2022-12-23

## 2022-12-23 LAB — HIV 1+2 AB+HIV1 P24 AG SERPL QL IA: NONREACTIVE

## 2022-12-23 NOTE — TELEPHONE ENCOUNTER
Call received from spouse calling on behalf of patient (consent to communicate on file) stating patient had a lab appointment yesterday for an A1c and in addition to this an HIV was also drawn. Per spouse this was not requested by the patient and spouse is wondering why this was drawn. Spouse states this can cause problems between a  and wife.     Writer is not able to find documentation of reason for order. Please advise.

## 2022-12-27 ENCOUNTER — NURSE TRIAGE (OUTPATIENT)
Dept: NURSING | Facility: CLINIC | Age: 57
End: 2022-12-27

## 2022-12-27 NOTE — TELEPHONE ENCOUNTER
Called pt, informed, discussed, educated, may reference  section for routine care gap guidelines, explained normally NWD would have discussed at visit but pt made lab appointment and lab processed under annual  renewal   Farhana Ospina RN, BSN  [facility]

## 2022-12-27 NOTE — TELEPHONE ENCOUNTER
Please call and let patient know that NWD is out of office all this week, so she can respond when she gets back.     You can let wife know that HIV screening is recommended one time for all adults as part of their routine preventative care.  I can only assume that the patient and NWD discussed this in the past and it was added as a future order, OR she signed the Health Maintenance Orders and it got added automatically from there.

## 2022-12-28 NOTE — TELEPHONE ENCOUNTER
Gabriela (wife) calls and says that a nurse called her  today with his glucose blood work and an HIV test result. Gabriela says that she wants to know who ordered that HIV test.Gabriela says that they were not aware that HIV would be drawn. Gabriela says that they are upset about the lack of communication about this being drawn. Gabriela says that she wished they were reminded that this would be drawn. Gabriela says that they had no idea that this was getting drawn. RN checked Epic and saw that Gabriela called and spoke to a nurse on 12/23/2022  about this. RN also saw that on 12/26/2022-Dr. Tess Rabago left a note in [facility] stating that the  Is out of the office all week and can respond when she gets back. RN told this to Gabriela and told Gabriela other information that Dr. Tess Rabago documented to let wife know. RN also saw that  A nurse from pt's clinic called pt. On 12/27/2022 and told Gabriela what that clinic nurse documented in EPIC. Gabriela voiced understanding. Gabriela says that to restore the trust that they had in the , Gabriela wants the  To call Milad when she is back in: [phone]. RN then left this message in [facility], as requested. COVID 19 Nurse Triage Plan/Patient Instructions    Please be aware that novel coronavirus (COVID-19) may be circulating in the community. If you develop symptoms such as fever, cough, or SOB or if you have concerns about the presence of another infection including coronavirus (COVID-19), please contact your health care provider or visit [email or web address].     Disposition/Instructions    Home care recommended. Follow home care protocol based instructions.    Thank you for taking steps to prevent the spread of this virus.  o Limit your contact with others.  o Wear a simple mask to cover your cough.  o Wash your hands well and often.    Resources    [facility]: About COVID-19: [email or web address]    CDC: What to Do If You're Sick:  www.cdc.gov/coronavirus/2019-ncov/about/steps-when-sick.html    CDC: Ending Home Isolation: www.cdc.gov/coronavirus/2019-ncov/hcp/disposition-in-home-patients.html     CDC: Caring for Someone: www.cdc.gov/coronavirus/2019-ncov/if-you-are-sick/care-for-someone.html     [facility]: Interim Guidance for Hospital Discharge to Home: [email or web address]/coronavirus/hcp/hospdischarge.pdf    [facility] clinical trials (COVID-19 research studies): [email or web address]     Below are the COVID-19 hotlines at the [place] Department of [facility] ([facility]). Interpreters are available.   o For health questions: Call 572-902-5767 or 1-763.511.4631 (7 a.m. to 7 p.m.)  o For questions about schools and childcare: Call 843-384-5253 or 1-194.261.6969 (7 a.m. to 7 p.m.)                     Reason for Disposition    [1] Follow-up call to recent contact AND [2] information only call, no triage required    Additional Information    Negative: [1] Caller is not with the adult (patient) AND [2] reporting urgent symptoms    Negative: Lab result questions    Negative: Medication questions    Negative: Caller can't be reached by phone    Negative: Caller has already spoken to PCP or another triager    Negative: RN needs further essential information from caller in order to complete triage    Negative: Requesting regular office appointment    Negative: [1] Caller requesting NON-URGENT health information AND [2] PCP's office is the best resource    Negative: Health Information question, no triage required and triager able to answer question    Negative: General information question, no triage required and triager able to answer question    Negative: Question about upcoming scheduled test, no triage required and triager able to answer question    Negative: [1] Caller is not with the adult (patient) AND [2] probable NON-URGENT symptoms    Protocols used: INFORMATION ONLY CALL - NO TRIAGE-A-

## 2023-04-30 ENCOUNTER — OFFICE VISIT (OUTPATIENT)
Dept: URGENT CARE | Facility: URGENT CARE | Age: 58
End: 2023-04-30
Payer: COMMERCIAL

## 2023-04-30 VITALS
RESPIRATION RATE: 16 BRPM | HEIGHT: 76 IN | BODY MASS INDEX: 28.62 KG/M2 | SYSTOLIC BLOOD PRESSURE: 120 MMHG | TEMPERATURE: 96.9 F | WEIGHT: 235 LBS | HEART RATE: 74 BPM | OXYGEN SATURATION: 98 % | DIASTOLIC BLOOD PRESSURE: 75 MMHG

## 2023-04-30 DIAGNOSIS — R05.9 COUGH, UNSPECIFIED TYPE: Primary | ICD-10-CM

## 2023-04-30 PROCEDURE — 99213 OFFICE O/P EST LOW 20 MIN: CPT | Performed by: FAMILY MEDICINE

## 2023-04-30 RX ORDER — BENZONATATE 200 MG/1
200 CAPSULE ORAL 3 TIMES DAILY PRN
Qty: 21 CAPSULE | Refills: 0 | Status: SHIPPED | OUTPATIENT
Start: 2023-04-30 | End: 2023-05-07

## 2023-04-30 NOTE — PROGRESS NOTES
"SUBJECTIVE: Milad Brown is a 57 year old male presenting with a chief complaint of cough .  Onset of symptoms was 3 day(s) ago.  Predisposing factors include None.    No past medical history on file.  Allergies   Allergen Reactions     Atorvastatin Muscle Pain (Myalgia)     Muscle aches     Crestor [Rosuvastatin] Muscle Pain (Myalgia)     Social History     Tobacco Use     Smoking status: Never     Smokeless tobacco: Never   Vaping Use     Vaping status: Never Used   Substance Use Topics     Alcohol use: No       ROS:  SKIN: no rash  GI: no vomiting    OBJECTIVE:  /75 (BP Location: Left arm, Patient Position: Sitting, Cuff Size: Adult Large)   Pulse 74   Temp 96.9  F (36.1  C) (Tympanic)   Resp 16   Ht 1.93 m (6' 4\")   Wt 106.6 kg (235 lb)   SpO2 98%   BMI 28.61 kg/m  GENERAL APPEARANCE: healthy, alert and no distress  EYES: EOMI,  PERRL, conjunctiva clear  HENT: ear canals and TM's normal.  Nose and mouth without ulcers, erythema or lesions  RESP: lungs clear to auscultation - no rales, rhonchi or wheezes  SKIN: no suspicious lesions or rashes      ICD-10-CM    1. Cough, unspecified type  R05.9 benzonatate (TESSALON) 200 MG capsule          Fluids/Rest, f/u if worse/not any better    "

## 2023-07-02 ENCOUNTER — HEALTH MAINTENANCE LETTER (OUTPATIENT)
Age: 58
End: 2023-07-02

## 2023-07-19 ASSESSMENT — ENCOUNTER SYMPTOMS
NERVOUS/ANXIOUS: 0
NAUSEA: 0
CHILLS: 0
DIZZINESS: 0
HEARTBURN: 0
ABDOMINAL PAIN: 0
HEMATOCHEZIA: 0
SHORTNESS OF BREATH: 0
SORE THROAT: 0
PARESTHESIAS: 0
DYSURIA: 0
EYE PAIN: 0
FEVER: 0
MYALGIAS: 0
PALPITATIONS: 0
HEMATURIA: 0
WEAKNESS: 0
HEADACHES: 0
COUGH: 0
DIARRHEA: 0
ARTHRALGIAS: 0
FREQUENCY: 0
JOINT SWELLING: 0
CONSTIPATION: 0

## 2023-07-20 ENCOUNTER — OFFICE VISIT (OUTPATIENT)
Dept: INTERNAL MEDICINE | Facility: CLINIC | Age: 58
End: 2023-07-20
Payer: COMMERCIAL

## 2023-07-20 VITALS
SYSTOLIC BLOOD PRESSURE: 125 MMHG | OXYGEN SATURATION: 99 % | RESPIRATION RATE: 16 BRPM | HEART RATE: 76 BPM | WEIGHT: 235.5 LBS | TEMPERATURE: 97.6 F | HEIGHT: 77 IN | BODY MASS INDEX: 27.81 KG/M2 | DIASTOLIC BLOOD PRESSURE: 60 MMHG

## 2023-07-20 DIAGNOSIS — Z12.5 SCREENING FOR PROSTATE CANCER: ICD-10-CM

## 2023-07-20 DIAGNOSIS — Z00.00 ANNUAL PHYSICAL EXAM: Primary | ICD-10-CM

## 2023-07-20 DIAGNOSIS — I10 ESSENTIAL HYPERTENSION: ICD-10-CM

## 2023-07-20 DIAGNOSIS — Z13.29 SCREENING FOR THYROID DISORDER: ICD-10-CM

## 2023-07-20 DIAGNOSIS — Z13.220 SCREENING FOR HYPERLIPIDEMIA: ICD-10-CM

## 2023-07-20 PROCEDURE — 99213 OFFICE O/P EST LOW 20 MIN: CPT | Mod: 25 | Performed by: INTERNAL MEDICINE

## 2023-07-20 PROCEDURE — 99396 PREV VISIT EST AGE 40-64: CPT | Performed by: INTERNAL MEDICINE

## 2023-07-20 ASSESSMENT — ENCOUNTER SYMPTOMS
DYSURIA: 0
SORE THROAT: 0
EYE PAIN: 0
ARTHRALGIAS: 0
COUGH: 0
PALPITATIONS: 0
FREQUENCY: 0
MYALGIAS: 0
DIARRHEA: 0
SHORTNESS OF BREATH: 0
CHILLS: 0
NERVOUS/ANXIOUS: 0
WEAKNESS: 0
HEMATURIA: 0
PARESTHESIAS: 0
FEVER: 0
ABDOMINAL PAIN: 0
JOINT SWELLING: 0
HEADACHES: 0
HEMATOCHEZIA: 0
DIZZINESS: 0
HEARTBURN: 0
CONSTIPATION: 0
NAUSEA: 0

## 2023-07-20 ASSESSMENT — PAIN SCALES - GENERAL: PAINLEVEL: NO PAIN (0)

## 2023-07-20 NOTE — PROGRESS NOTES
SUBJECTIVE:   CC: Milad is an 57 year old who presents for preventative health visit.       7/20/2023     3:38 PM   Additional Questions   Roomed by azamit   Accompanied by self         7/20/2023     3:38 PM   Patient Reported Additional Medications   Patient reports taking the following new medications none     Patient is a 57-year-old -American male who presents to the clinic for his annual physical.  He has no acute concerns or complaints today.  He does have a history of hypertension, and he has been taking hydrochlorothiazide 25 mg daily for management of his blood pressure.  Patient does check his blood pressure intermittently at home, and he states that his systolic pressure is typically around 130.  He is tolerating medication without issue.  Patient reports a stable appetite.  He is stooling and voiding without difficulty.  Patient is not fasting for lab work today.    Healthy Habits:     Getting at least 3 servings of Calcium per day:  Yes    Bi-annual eye exam:  Yes    Dental care twice a year:  Yes    Sleep apnea or symptoms of sleep apnea:  Sleep apnea    Diet:  Regular (no restrictions)    Frequency of exercise:  2-3 days/week    Duration of exercise:  45-60 minutes    Taking medications regularly:  Yes    Medication side effects:  None    Additional concerns today:  No      Today's PHQ-2 Score:       7/19/2023     9:09 PM   PHQ-2 ( 1999 [employer])   Q1: Little interest or pleasure in doing things 0   Q2: Feeling down, depressed or hopeless 0   PHQ-2 Score 0   Q1: Little interest or pleasure in doing things Not at all   Q2: Feeling down, depressed or hopeless Not at all   PHQ-2 Score 0           Social History     Tobacco Use     Smoking status: Never     Smokeless tobacco: Never   Substance Use Topics     Alcohol use: No           7/19/2023     9:09 PM   Alcohol Use   Prescreen: >3 drinks/day or >7 drinks/week? No       Last PSA:   PSA   Date Value Ref Range Status   02/06/2021 1.04 0 - 4 ug/L  "Final     Comment:     Assay Method:  Chemiluminescence using Siemens Vista analyzer     Prostate Specific Antigen Screen   Date Value Ref Range Status   05/05/2022 1.24 0.00 - 4.00 ug/L Final       Reviewed orders with patient. Reviewed health maintenance and updated orders accordingly - Yes  Lab work is in process    Reviewed and updated as needed this visit by clinical staff   Tobacco  Allergies  Meds              Reviewed and updated as needed this visit by Provider                     Review of Systems   Constitutional: Negative for chills and fever.   HENT: Negative for congestion, ear pain, hearing loss and sore throat.    Eyes: Negative for pain and visual disturbance.   Respiratory: Negative for cough and shortness of breath.    Cardiovascular: Negative for chest pain, palpitations and peripheral edema.   Gastrointestinal: Negative for abdominal pain, constipation, diarrhea, heartburn, hematochezia and nausea.   Genitourinary: Negative for dysuria, frequency, genital sores, hematuria, impotence, penile discharge and urgency.   Musculoskeletal: Negative for arthralgias, joint swelling and myalgias.   Skin: Negative for rash.   Neurological: Negative for dizziness, weakness, headaches and paresthesias.   Psychiatric/Behavioral: Negative for mood changes. The patient is not nervous/anxious.        OBJECTIVE:   /60 (BP Location: Left arm, Patient Position: Sitting, Cuff Size: Adult Regular)   Pulse 76   Temp 97.6  F (36.4  C)   Resp 16   Ht 1.956 m (6' 5\")   Wt 106.8 kg (235 lb 8 oz)   SpO2 99%   BMI 27.93 kg/m      Physical Exam  Vitals reviewed.   HENT:      Head: Normocephalic and atraumatic.      Right Ear: Tympanic membrane, ear canal and external ear normal.      Left Ear: Tympanic membrane, ear canal and external ear normal.      Mouth/Throat:      Mouth: Mucous membranes are moist.      Pharynx: Oropharynx is clear.   Eyes:      Extraocular Movements: Extraocular movements intact.      " Conjunctiva/sclera: Conjunctivae normal.      Pupils: Pupils are equal, round, and reactive to light.   Cardiovascular:      Rate and Rhythm: Normal rate and regular rhythm.      Pulses: Normal pulses.      Heart sounds: Normal heart sounds.   Pulmonary:      Effort: Pulmonary effort is normal.      Breath sounds: Normal breath sounds.   Abdominal:      General: Bowel sounds are normal.      Palpations: Abdomen is soft.   Musculoskeletal:         General: Normal range of motion.      Cervical back: Normal range of motion and neck supple.   Skin:     General: Skin is warm and dry.      Capillary Refill: Capillary refill takes less than 2 seconds.   Neurological:      General: No focal deficit present.      Mental Status: He is alert and oriented to person, place, and time. Mental status is at baseline.   Psychiatric:         Mood and Affect: Mood normal.         Behavior: Behavior normal.         Thought Content: Thought content normal.         Judgment: Judgment normal.       Diagnostic Test Results: Future lab orders for CMP, CBC, FLP, TSH, and PSA have been placed.    ASSESSMENT/PLAN:   (Z00.00) Annual physical exam  (primary encounter diagnosis)  Comment: At this time, patient is noted to have an unremarkable physical examination.  His blood pressure is noted to be at an acceptable level.  He will return at a later time for fasting lab collection.  All health maintenance items were discussed.    (I10) Essential hypertension  Comment: Patient blood pressure does appear to be under good control.  Assuming no unexpected abnormalities on his outstanding metabolic panel, we will continue his hydrochlorothiazide at 25 mg daily for ongoing management of his blood pressure.  Side effects of medication were reviewed.  Dietary modifications for improved blood pressure control were reviewed.  Patient was encouraged to continue monitoring his blood pressure outside the clinic setting.    (Z13.220) Screening for  "hyperlipidemia  Comment: Lipid panel reflex to direct LDL Fasting is pending.    (Z13.29) Screening for thyroid disorder  Comment: TSH with free T4 reflex is pending.    (Z12.5) Screening for prostate cancer  Comment: PSA, screen is pending.      Patient has been advised of split billing requirements and indicates understanding: Yes      COUNSELING:   Reviewed preventive health counseling, as reflected in patient instructions      BMI:   Estimated body mass index is 27.93 kg/m  as calculated from the following:    Height as of this encounter: 1.956 m (6' 5\").    Weight as of this encounter: 106.8 kg (235 lb 8 oz).   Weight management plan: Discussed healthy diet and exercise guidelines      He reports that he has never smoked. He has never used smokeless tobacco.        Santiago Workman MD  [facility]  "

## 2023-07-27 ENCOUNTER — LAB (OUTPATIENT)
Dept: LAB | Facility: CLINIC | Age: 58
End: 2023-07-27
Payer: COMMERCIAL

## 2023-07-27 ENCOUNTER — OFFICE VISIT (OUTPATIENT)
Dept: FAMILY MEDICINE | Facility: CLINIC | Age: 58
End: 2023-07-27
Payer: COMMERCIAL

## 2023-07-27 VITALS
HEIGHT: 77 IN | SYSTOLIC BLOOD PRESSURE: 116 MMHG | TEMPERATURE: 97.8 F | HEART RATE: 70 BPM | WEIGHT: 234.7 LBS | DIASTOLIC BLOOD PRESSURE: 69 MMHG | RESPIRATION RATE: 10 BRPM | BODY MASS INDEX: 27.71 KG/M2 | OXYGEN SATURATION: 99 %

## 2023-07-27 DIAGNOSIS — Z12.5 SCREENING FOR PROSTATE CANCER: ICD-10-CM

## 2023-07-27 DIAGNOSIS — Z13.220 SCREENING FOR HYPERLIPIDEMIA: ICD-10-CM

## 2023-07-27 DIAGNOSIS — I10 ESSENTIAL HYPERTENSION: ICD-10-CM

## 2023-07-27 DIAGNOSIS — Z00.00 ANNUAL PHYSICAL EXAM: ICD-10-CM

## 2023-07-27 DIAGNOSIS — R10.32 LLQ ABDOMINAL PAIN: Primary | ICD-10-CM

## 2023-07-27 DIAGNOSIS — Z13.29 SCREENING FOR THYROID DISORDER: ICD-10-CM

## 2023-07-27 LAB
ALBUMIN SERPL BCG-MCNC: 4.6 G/DL (ref 3.5–5.2)
ALP SERPL-CCNC: 74 U/L (ref 40–129)
ALT SERPL W P-5'-P-CCNC: 68 U/L (ref 0–70)
ANION GAP SERPL CALCULATED.3IONS-SCNC: 10 MMOL/L (ref 7–15)
AST SERPL W P-5'-P-CCNC: 46 U/L (ref 0–45)
BASOPHILS # BLD AUTO: 0 10E3/UL (ref 0–0.2)
BASOPHILS NFR BLD AUTO: 0 %
BILIRUB SERPL-MCNC: 0.4 MG/DL
BUN SERPL-MCNC: 12.4 MG/DL (ref 8–23)
CALCIUM SERPL-MCNC: 9.4 MG/DL (ref 8.6–10)
CHLORIDE SERPL-SCNC: 110 MMOL/L (ref 98–107)
CHOLEST SERPL-MCNC: 201 MG/DL
CREAT SERPL-MCNC: 1.03 MG/DL (ref 0.67–1.17)
DEPRECATED HCO3 PLAS-SCNC: 25 MMOL/L (ref 22–29)
EOSINOPHIL # BLD AUTO: 0.1 10E3/UL (ref 0–0.7)
EOSINOPHIL NFR BLD AUTO: 2 %
ERYTHROCYTE [DISTWIDTH] IN BLOOD BY AUTOMATED COUNT: 14 % (ref 10–15)
GFR SERPL CREATININE-BSD FRML MDRD: 84 ML/MIN/1.73M2
GLUCOSE SERPL-MCNC: 91 MG/DL (ref 70–99)
HCT VFR BLD AUTO: 42 % (ref 40–53)
HDLC SERPL-MCNC: 71 MG/DL
HGB BLD-MCNC: 13.6 G/DL (ref 13.3–17.7)
IMM GRANULOCYTES # BLD: 0 10E3/UL
IMM GRANULOCYTES NFR BLD: 0 %
LDLC SERPL CALC-MCNC: 104 MG/DL
LYMPHOCYTES # BLD AUTO: 2.7 10E3/UL (ref 0.8–5.3)
LYMPHOCYTES NFR BLD AUTO: 51 %
MCH RBC QN AUTO: 28 PG (ref 26.5–33)
MCHC RBC AUTO-ENTMCNC: 32.4 G/DL (ref 31.5–36.5)
MCV RBC AUTO: 87 FL (ref 78–100)
MONOCYTES # BLD AUTO: 0.5 10E3/UL (ref 0–1.3)
MONOCYTES NFR BLD AUTO: 9 %
NEUTROPHILS # BLD AUTO: 2 10E3/UL (ref 1.6–8.3)
NEUTROPHILS NFR BLD AUTO: 38 %
NONHDLC SERPL-MCNC: 130 MG/DL
NRBC # BLD AUTO: 0 10E3/UL
NRBC BLD AUTO-RTO: 0 /100
PLATELET # BLD AUTO: 193 10E3/UL (ref 150–450)
POTASSIUM SERPL-SCNC: 3.7 MMOL/L (ref 3.4–5.3)
PROT SERPL-MCNC: 7.2 G/DL (ref 6.4–8.3)
PSA SERPL DL<=0.01 NG/ML-MCNC: 1.17 NG/ML (ref 0–3.5)
RBC # BLD AUTO: 4.85 10E6/UL (ref 4.4–5.9)
SODIUM SERPL-SCNC: 145 MMOL/L (ref 136–145)
TRIGL SERPL-MCNC: 132 MG/DL
TSH SERPL DL<=0.005 MIU/L-ACNC: 1.22 UIU/ML (ref 0.3–4.2)
WBC # BLD AUTO: 5.3 10E3/UL (ref 4–11)

## 2023-07-27 PROCEDURE — 36415 COLL VENOUS BLD VENIPUNCTURE: CPT

## 2023-07-27 PROCEDURE — 80053 COMPREHEN METABOLIC PANEL: CPT

## 2023-07-27 PROCEDURE — 85025 COMPLETE CBC W/AUTO DIFF WBC: CPT

## 2023-07-27 PROCEDURE — 99213 OFFICE O/P EST LOW 20 MIN: CPT | Performed by: PHYSICIAN ASSISTANT

## 2023-07-27 PROCEDURE — 84443 ASSAY THYROID STIM HORMONE: CPT

## 2023-07-27 PROCEDURE — 80061 LIPID PANEL: CPT

## 2023-07-27 PROCEDURE — G0103 PSA SCREENING: HCPCS

## 2023-07-27 NOTE — PROGRESS NOTES
"  Assessment & Plan     LLQ abdominal pain    Likely muscle strain. Labs are being done by primary care provider which would detect any sign of infection or kidney function abnormality. Monitor pain and if recurs, recommend a visit while having pain if possible. Also reviewed his colonoscopy from 2015 which does not show diverticulosis so diverticulitis is unlikely. OK to use Tylenol, ibuprofen, or Aleve as needed for pain.                 [facility]    Liam Stinson is a 57 year old, presenting for the following health issues:  Abdominal Pain        7/27/2023     2:09 PM   Additional Questions   Roomed by Bam ESPINAL     Pain History:  When did you first notice your pain? 07/21/2023   Have you seen anyone else for your pain? No  How has your pain affected your ability to work? Not applicable  Where in your body do you have pain? Abdominal/Flank Pain  Onset/Duration:Patient first felt pain on 7/21/23 and on Tuesday night his pain was gone  Description:   Character: Dull ache  Location: left lower quadrant  Radiation: None  Accompanying Signs & Symptoms:  Fever/Chills: No  Gas/Bloating: No  Nausea: No  Vomitting: No  Diarrhea: No  Constipation: No  Dysuria or Hematuria: No  Therapies tried and outcome: Aleve- helpful     Patient has been doing a lot of ab exercises so believes that it may be a pulled muscle. Pain is completely resolved now.      Review of Systems   Constitutional, HEENT, cardiovascular, pulmonary, gi and gu systems are negative, except as otherwise noted.        Objective    /69 (BP Location: Right arm, Patient Position: Sitting, Cuff Size: Adult Regular)   Pulse 70   Temp 97.8  F (36.6  C) (Oral)   Resp 10   Ht 1.956 m (6' 5\")   Wt 106.5 kg (234 lb 11.2 oz)   SpO2 99%   BMI 27.83 kg/m    Body mass index is 27.83 kg/m .      Physical Exam   GENERAL: healthy, alert and no distress  EYES: Eyes grossly normal to inspection, PERRL " and conjunctivae and sclerae normal  RESP: lungs clear to auscultation - no rales, rhonchi or wheezes  CV: regular rate and rhythm, normal S1 S2, no S3 or S4, no murmur, click or rub, no peripheral edema and peripheral pulses strong  ABDOMEN: soft, nontender, no hepatosplenomegaly, no masses and bowel sounds normal  MS: no gross musculoskeletal defects noted, no edema  SKIN: no suspicious lesions or rashes  NEURO: Normal strength and tone, mentation intact and speech normal  PSYCH: mentation appears normal, affect normal/bright

## 2023-09-11 ENCOUNTER — OFFICE VISIT (OUTPATIENT)
Dept: INTERNAL MEDICINE | Facility: CLINIC | Age: 58
End: 2023-09-11
Payer: COMMERCIAL

## 2023-09-11 VITALS
DIASTOLIC BLOOD PRESSURE: 86 MMHG | HEART RATE: 82 BPM | TEMPERATURE: 98.1 F | SYSTOLIC BLOOD PRESSURE: 142 MMHG | HEIGHT: 77 IN | OXYGEN SATURATION: 98 % | BODY MASS INDEX: 28.1 KG/M2 | WEIGHT: 238 LBS | RESPIRATION RATE: 18 BRPM

## 2023-09-11 DIAGNOSIS — G47.33 OSA ON CPAP: ICD-10-CM

## 2023-09-11 DIAGNOSIS — I10 ESSENTIAL HYPERTENSION: Primary | ICD-10-CM

## 2023-09-11 PROCEDURE — 99214 OFFICE O/P EST MOD 30 MIN: CPT | Performed by: INTERNAL MEDICINE

## 2023-09-11 ASSESSMENT — ENCOUNTER SYMPTOMS
NEUROLOGICAL NEGATIVE: 1
RESPIRATORY NEGATIVE: 1
MUSCULOSKELETAL NEGATIVE: 1
GASTROINTESTINAL NEGATIVE: 1
CONSTITUTIONAL NEGATIVE: 1
CARDIOVASCULAR NEGATIVE: 1

## 2023-09-11 NOTE — PROGRESS NOTES
{PROVIDER CHARTING PREFERENCE:075902}    Liam Stinson is a 58 year old, presenting for the following health issues:  Patient is being seen to discuss hypertension and sleep apnea.  {(!) Visit Details have not yet been documented.  Please enter Visit Details and then use this list to pull in documentation. (Optional):964097}    History of Present Illness       Hypertension: He presents for follow up of hypertension.  He does check blood pressure  regularly outside of the clinic. Outpatient blood pressures have not been over 140/90. He does not follow a low salt diet.     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    He eats 4 or more servings of fruits and vegetables daily.He consumes 1 sweetened beverage(s) daily.He exercises with enough effort to increase his heart rate 30 to 60 minutes per day.  He exercises with enough effort to increase his heart rate 7 days per week.   He is taking medications regularly.     {SUPERLIST (Optional):584589}  {additonal problems for provider to add (Optional):934011}      Review of Systems   {ROS COMP (Optional):695624}      Objective    There were no vitals taken for this visit.  There is no height or weight on file to calculate BMI.  Physical Exam   {Exam List (Optional):288490}    {Diagnostic Test Results (Optional):147992}    {AMBULATORY ATTESTATION (Optional):495378}

## 2023-09-11 NOTE — PROGRESS NOTES
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  His initial blood pressure was noted to be quite elevated at 194/103, but his blood pressure did improve significantly at this time.  He has filed for consistent with his departure was 132/86.  Paperwork confirming his use of hydrochlorothiazide 25 mg daily for management of his blood pressure was completed.  He did receive a copy of today's clinic visit as well.    LEBRON on CPAP  Patient is compliant with CPAP use.  He does have documentation to attach to his DOT forms.  DOT forms that were required by me were completed today.      30 minutes spent by me on the date of the encounter doing chart review, history and exam, documentation and further activities per the note       See Patient Instructions    Santiago Workman MD  [facility]    Liam Stinson is a 58 year old, presenting for the following health issues:  Hypertension and sleep concerns      Patient is a 58-year-old male who presents to the clinic to follow-up on his blood pressure.  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  Patient does have a sleep report from his sleep clinic present with him today.  This report does show that he has been compliant with his use of his CPAP device.  He does take 25 mg of hydrochlorothiazide daily for management of his blood pressure.  Patient does check his blood pressure frequently at home, and systolic blood pressure typically ranges between 117 and 140.  Diastolic pressures are typically 77 and 85.  Unfortunately, it does appear that Milad does have a component of whitecoat hypertension as his blood pressure does tend to run higher at the time of his DOT recertification.  His initial blood pressure " "upon arrival to the clinic today was noted to be 194/103.  His repeat blood pressure after approximately 15 minutes was noted to be 160/90.    History of Present Illness       Hypertension: He presents for follow up of hypertension.  He does check blood pressure  regularly outside of the clinic. Outpatient blood pressures have not been over 140/90. He does not follow a low salt diet.     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    He eats 4 or more servings of fruits and vegetables daily.He consumes 1 sweetened beverage(s) daily.He exercises with enough effort to increase his heart rate 30 to 60 minutes per day.  He exercises with enough effort to increase his heart rate 7 days per week.   He is taking medications regularly.         Review of Systems   Constitutional: Negative.    HENT: Negative.     Respiratory: Negative.     Cardiovascular: Negative.    Gastrointestinal: Negative.    Musculoskeletal: Negative.    Neurological: Negative.             Objective    Initial blood pressure was 194/103, repeat blood pressure after 15 minutes was 160/90, and final blood pressure was 142/86  Blood pressure (!) 142/86, pulse 82, temperature 98.1  F (36.7  C), resp. rate 18, height 1.956 m (6' 5\"), weight 108 kg (238 lb), SpO2 98 %.    Physical Exam  Vitals reviewed.   HENT:      Head: Normocephalic and atraumatic.      Mouth/Throat:      Mouth: Mucous membranes are moist.      Pharynx: Oropharynx is clear.   Eyes:      Extraocular Movements: Extraocular movements intact.      Conjunctiva/sclera: Conjunctivae normal.      Pupils: Pupils are equal, round, and reactive to light.   Cardiovascular:      Rate and Rhythm: Normal rate and regular rhythm.      Pulses: Normal pulses.      Heart sounds: Normal heart sounds.   Pulmonary:      Effort: Pulmonary effort is normal.      Breath sounds: Normal breath sounds.   Skin:     General: Skin is " warm and dry.      Capillary Refill: Capillary refill takes less than 2 seconds.   Neurological:      General: No focal deficit present.      Mental Status: He is alert and oriented to person, place, and time. Mental status is at baseline.

## 2023-09-11 NOTE — PATIENT INSTRUCTIONS
Patient's blood pressure did slowly improve with time throughout his clinic visit.  Paperwork completed for him today.  He will continue his hydrochlorothiazide 25 mg daily.

## 2023-11-17 ENCOUNTER — TELEPHONE (OUTPATIENT)
Dept: INTERNAL MEDICINE | Facility: CLINIC | Age: 58
End: 2023-11-17
Payer: COMMERCIAL

## 2023-11-17 NOTE — TELEPHONE ENCOUNTER
"Spouse says she performed at home COVID test on patient and it came back positive, but she isn't sure that she did it correctly. Spouse is asking for someone to come test patient.  Patient has complained of a \"head cold' with cough for 2 days. Advised that spouse can perform home test again on patient because if patient had swab in clinic/during a visit they would be charged. This writer went over how to perform at home COVID swab and when to call clinic back regarding treatment (if they want it) or red flag symptoms.    Thank you,  Bartolome Hagan, Triage RN [facility]  8:31 AM 11/17/2023     "

## 2024-01-11 NOTE — TELEPHONE ENCOUNTER
FUTURE VISIT INFORMATION      FUTURE VISIT INFORMATION:  Date: 3/19/24  Time: 11:20AM  Location: [place]  REFERRAL INFORMATION:  Referring provider:    Referring providers clinic:    Reason for visit/diagnosis      RECORDS REQUESTED FROM:       No records

## 2024-02-21 ENCOUNTER — PATIENT OUTREACH (OUTPATIENT)
Dept: INTERNAL MEDICINE | Facility: CLINIC | Age: 59
End: 2024-02-21
Payer: COMMERCIAL

## 2024-02-21 NOTE — TELEPHONE ENCOUNTER
Patient Quality Outreach    Patient is due for the following:   Hypertension -  Hypertension follow-up visit      Topic Date Due    Zoster (Shingles) Vaccine (1 of 2) Never done    Flu Vaccine (1) 09/01/2023    COVID-19 Vaccine (3 - 2023-24 season) 09/01/2023       Next Steps:   Patient has upcoming appointment, these items will be addressed at that time.    Type of outreach:    Chart review performed, no outreach needed.      Questions for provider review:    None           Loan Cisneros MA

## 2024-03-19 ENCOUNTER — PRE VISIT (OUTPATIENT)
Dept: OTOLARYNGOLOGY | Facility: CLINIC | Age: 59
End: 2024-03-19

## 2024-07-30 SDOH — HEALTH STABILITY: PHYSICAL HEALTH: ON AVERAGE, HOW MANY MINUTES DO YOU ENGAGE IN EXERCISE AT THIS LEVEL?: 20 MIN

## 2024-07-30 SDOH — HEALTH STABILITY: PHYSICAL HEALTH: ON AVERAGE, HOW MANY DAYS PER WEEK DO YOU ENGAGE IN MODERATE TO STRENUOUS EXERCISE (LIKE A BRISK WALK)?: 7 DAYS

## 2024-07-30 ASSESSMENT — SOCIAL DETERMINANTS OF HEALTH (SDOH): HOW OFTEN DO YOU GET TOGETHER WITH FRIENDS OR RELATIVES?: ONCE A WEEK

## 2024-07-31 ENCOUNTER — OFFICE VISIT (OUTPATIENT)
Dept: INTERNAL MEDICINE | Facility: CLINIC | Age: 59
End: 2024-07-31
Attending: INTERNAL MEDICINE
Payer: COMMERCIAL

## 2024-07-31 VITALS
BODY MASS INDEX: 26.8 KG/M2 | RESPIRATION RATE: 18 BRPM | DIASTOLIC BLOOD PRESSURE: 84 MMHG | TEMPERATURE: 97.9 F | HEIGHT: 77 IN | HEART RATE: 78 BPM | WEIGHT: 227 LBS | SYSTOLIC BLOOD PRESSURE: 138 MMHG | OXYGEN SATURATION: 98 %

## 2024-07-31 DIAGNOSIS — Z00.00 ANNUAL PHYSICAL EXAM: Primary | ICD-10-CM

## 2024-07-31 DIAGNOSIS — I10 ESSENTIAL HYPERTENSION: ICD-10-CM

## 2024-07-31 DIAGNOSIS — Z12.5 PROSTATE CANCER SCREENING: ICD-10-CM

## 2024-07-31 DIAGNOSIS — Z13.220 SCREENING FOR HYPERLIPIDEMIA: ICD-10-CM

## 2024-07-31 PROCEDURE — 99396 PREV VISIT EST AGE 40-64: CPT | Performed by: INTERNAL MEDICINE

## 2024-07-31 PROCEDURE — 99213 OFFICE O/P EST LOW 20 MIN: CPT | Mod: 25 | Performed by: INTERNAL MEDICINE

## 2024-07-31 NOTE — PROGRESS NOTES
"Preventive Care Visit  [facility]  Santiago Workman MD, Internal Medicine  Jul 31, 2024      Assessment & Plan     Annual physical exam  At this time, patient does have an unremarkable physical examination.  His blood pressure is noted to be at an acceptable level.  He will return at a later time for fasting lab collection.  All health maintenance items were addressed.  - CBC with platelets and differential; Future    Essential hypertension  Patient blood pressure is currently under good control.  Assuming no unexpected abnormalities on his outstanding metabolic panel, we will continue his hydrochlorothiazide at 25 mg by mouth per day.  Side effects of hydrochlorothiazide use were reviewed.  Patient was encouraged to monitor his blood pressure outside the clinic setting.  - Comprehensive metabolic panel (BMP + Alb, Alk Phos, ALT, AST, Total. Bili, TP); Future    Screening for hyperlipidemia  - Lipid panel reflex to direct LDL Fasting; Future    Prostate cancer screening  - PSA, screen; Future    Patient has been advised of split billing requirements and indicates understanding: Yes        BMI  Estimated body mass index is 26.92 kg/m  as calculated from the following:    Height as of this encounter: 1.956 m (6' 5\").    Weight as of this encounter: 103 kg (227 lb).   Weight management plan: Discussed healthy diet and exercise guidelines    Counseling  Appropriate preventive services were addressed with this patient via screening, questionnaire, or discussion as appropriate for fall prevention, nutrition, physical activity, Tobacco-use cessation, weight loss and cognition.  Checklist reviewing preventive services available has been given to the patient.  Reviewed patient's diet, addressing concerns and/or questions.       See Patient Instructions    Liam Stinson is a 58 year old, presenting for the following:  Physical        7/31/2024     4:48 PM   Additional Questions   Roomed by " TOSHIA Carpenter        Health Care Directive  Patient does not have a Health Care Directive or Living Will: Discussed advance care planning with patient; however, patient declined at this time.    Patient is a 58-year-old -American male who presents to the clinic for his annual physical.  Patient has no acute concerns or complaints.  He does have a history of hypertension, and he has been taking hydrochlorothiazide 25 mg by mouth per day for management.  Patient is tolerating medication without issue.  He does check his blood pressure intermittently at home, and he states that his systolic pressures typically in the 120s and 130s.  Patient reports a stable appetite.  He is stooling and voiding without issue.  Patient is not fasting today.        Hypertension Follow-up    Do you check your blood pressure regularly outside of the clinic? Yes   Are you following a low salt diet? Yes  Are your blood pressures ever more than 140 on the top number (systolic) OR more   than 90 on the bottom number (diastolic), for example 140/90? No        7/30/2024   General Health   How would you rate your overall physical health? Good   Feel stress (tense, anxious, or unable to sleep) Not at all            7/30/2024   Nutrition   Three or more servings of calcium each day? Yes   Diet: Regular (no restrictions)   How many servings of fruit and vegetables per day? (!) 2-3   How many sweetened beverages each day? 0-1            7/30/2024   Exercise   Days per week of moderate/strenous exercise 7 days   Average minutes spent exercising at this level 20 min            7/30/2024   Social Factors   Frequency of gathering with friends or relatives Once a week   Worry food won't last until get money to buy more No   Food not last or not have enough money for food? No   Do you have housing? (Housing is defined as stable permanent housing and does not include staying ouside in a car, in a tent, in an abandoned building, in an overnight  shelter, or couch-surfing.) Yes   Are you worried about losing your housing? No   Lack of transportation? No   Unable to get utilities (heat,electricity)? No            7/30/2024   Fall Risk   Fallen 2 or more times in the past year? No   Trouble with walking or balance? No             7/30/2024   Dental   Dentist two times every year? Yes            7/30/2024   TB Screening   Were you born outside of the [place]? No            Today's PHQ-2 Score:       7/30/2024     7:47 PM   PHQ-2 ( 1999 [employer])   Q1: Little interest or pleasure in doing things 0   Q2: Feeling down, depressed or hopeless 0   PHQ-2 Score 0   Q1: Little interest or pleasure in doing things Not at all   Q2: Feeling down, depressed or hopeless Not at all   PHQ-2 Score 0           7/30/2024   Substance Use   Alcohol more than 3/day or more than 7/wk No   Do you use any other substances recreationally? No        Social History     Tobacco Use    Smoking status: Never     Passive exposure: Never    Smokeless tobacco: Never   Vaping Use    Vaping status: Never Used   Substance Use Topics    Alcohol use: No    Drug use: No           7/30/2024   STI Screening   New sexual partner(s) since last STI/HIV test? No      Last PSA:   PSA   Date Value Ref Range Status   02/06/2021 1.04 0 - 4 ug/L Final     Comment:     Assay Method:  Chemiluminescence using Siemens Vista analyzer     Prostate Specific Antigen Screen   Date Value Ref Range Status   07/27/2023 1.17 0.00 - 3.50 ng/mL Final   05/05/2022 1.24 0.00 - 4.00 ug/L Final     ASCVD Risk   The ASCVD Risk score (Salvador NGUYEN, et al., 2019) failed to calculate for the following reasons:    The patient has a prior MI or stroke diagnosis      Reviewed and updated as needed this visit by Provider                    Lab work is in process      Review of Systems  CONSTITUTIONAL: NEGATIVE for fever, chills, change in weight  INTEGUMENTARY/SKIN: NEGATIVE for worrisome rashes, moles or lesions  ENT/MOUTH: NEGATIVE for  "ear, mouth and throat problems  RESP: NEGATIVE for significant cough or SOB  CV: NEGATIVE for chest pain, palpitations or peripheral edema  GI: NEGATIVE for nausea, abdominal pain, heartburn, or change in bowel habits  : NEGATIVE for frequency, dysuria, or hematuria  MUSCULOSKELETAL: NEGATIVE for significant arthralgias or myalgia  NEURO: NEGATIVE for weakness, dizziness or paresthesias     Objective    Exam  /84   Pulse 78   Temp 97.9  F (36.6  C)   Resp 18   Ht 1.956 m (6' 5\")   Wt 103 kg (227 lb)   SpO2 98%   BMI 26.92 kg/m     Estimated body mass index is 26.92 kg/m  as calculated from the following:    Height as of this encounter: 1.956 m (6' 5\").    Weight as of this encounter: 103 kg (227 lb).    Physical Exam  Vitals reviewed.   Constitutional:       Appearance: Normal appearance.   HENT:      Head: Normocephalic and atraumatic.      Right Ear: Tympanic membrane, ear canal and external ear normal.      Left Ear: Tympanic membrane, ear canal and external ear normal.      Mouth/Throat:      Mouth: Mucous membranes are moist.      Pharynx: Oropharynx is clear.   Eyes:      Extraocular Movements: Extraocular movements intact.      Conjunctiva/sclera: Conjunctivae normal.      Pupils: Pupils are equal, round, and reactive to light.   Cardiovascular:      Rate and Rhythm: Normal rate and regular rhythm.      Pulses: Normal pulses.      Heart sounds: Normal heart sounds.   Pulmonary:      Effort: Pulmonary effort is normal.      Breath sounds: Normal breath sounds.   Abdominal:      General: Bowel sounds are normal.      Palpations: Abdomen is soft.   Musculoskeletal:         General: Normal range of motion.      Cervical back: Normal range of motion and neck supple.   Skin:     General: Skin is warm and dry.      Capillary Refill: Capillary refill takes less than 2 seconds.   Neurological:      General: No focal deficit present.      Mental Status: He is alert and oriented to person, place, and " time.       Diagnostic testing: Future orders for CMP, CBC, FLP, and PSA have been placed.        Signed Electronically by: Santiago Workman MD

## 2024-08-02 DIAGNOSIS — I10 ESSENTIAL HYPERTENSION: ICD-10-CM

## 2024-08-02 RX ORDER — HYDROCHLOROTHIAZIDE 25 MG/1
TABLET ORAL
Qty: 90 TABLET | Refills: 3 | Status: SHIPPED | OUTPATIENT
Start: 2024-08-02

## 2024-08-06 ENCOUNTER — LAB (OUTPATIENT)
Dept: LAB | Facility: CLINIC | Age: 59
End: 2024-08-06
Payer: COMMERCIAL

## 2024-08-06 DIAGNOSIS — Z13.220 SCREENING FOR HYPERLIPIDEMIA: ICD-10-CM

## 2024-08-06 DIAGNOSIS — Z00.00 ANNUAL PHYSICAL EXAM: ICD-10-CM

## 2024-08-06 DIAGNOSIS — Z12.5 PROSTATE CANCER SCREENING: ICD-10-CM

## 2024-08-06 DIAGNOSIS — I10 ESSENTIAL HYPERTENSION: ICD-10-CM

## 2024-08-06 LAB
ALBUMIN SERPL BCG-MCNC: 4.4 G/DL (ref 3.5–5.2)
ALP SERPL-CCNC: 77 U/L (ref 40–150)
ALT SERPL W P-5'-P-CCNC: 62 U/L (ref 0–70)
ANION GAP SERPL CALCULATED.3IONS-SCNC: 9 MMOL/L (ref 7–15)
AST SERPL W P-5'-P-CCNC: 43 U/L (ref 0–45)
BASOPHILS # BLD AUTO: 0 10E3/UL (ref 0–0.2)
BASOPHILS NFR BLD AUTO: 0 %
BILIRUB SERPL-MCNC: 0.4 MG/DL
BUN SERPL-MCNC: 13.3 MG/DL (ref 8–23)
CALCIUM SERPL-MCNC: 9.4 MG/DL (ref 8.8–10.4)
CHLORIDE SERPL-SCNC: 107 MMOL/L (ref 98–107)
CHOLEST SERPL-MCNC: 185 MG/DL
CREAT SERPL-MCNC: 1.06 MG/DL (ref 0.67–1.17)
EGFRCR SERPLBLD CKD-EPI 2021: 80 ML/MIN/1.73M2
EOSINOPHIL # BLD AUTO: 0 10E3/UL (ref 0–0.7)
EOSINOPHIL NFR BLD AUTO: 1 %
ERYTHROCYTE [DISTWIDTH] IN BLOOD BY AUTOMATED COUNT: 13.5 % (ref 10–15)
FASTING STATUS PATIENT QL REPORTED: YES
FASTING STATUS PATIENT QL REPORTED: YES
GLUCOSE SERPL-MCNC: 89 MG/DL (ref 70–99)
HCO3 SERPL-SCNC: 27 MMOL/L (ref 22–29)
HCT VFR BLD AUTO: 40.7 % (ref 40–53)
HDLC SERPL-MCNC: 85 MG/DL
HGB BLD-MCNC: 13.2 G/DL (ref 13.3–17.7)
IMM GRANULOCYTES # BLD: 0 10E3/UL
IMM GRANULOCYTES NFR BLD: 0 %
LDLC SERPL CALC-MCNC: 90 MG/DL
LYMPHOCYTES # BLD AUTO: 2.5 10E3/UL (ref 0.8–5.3)
LYMPHOCYTES NFR BLD AUTO: 48 %
MCH RBC QN AUTO: 28 PG (ref 26.5–33)
MCHC RBC AUTO-ENTMCNC: 32.4 G/DL (ref 31.5–36.5)
MCV RBC AUTO: 86 FL (ref 78–100)
MONOCYTES # BLD AUTO: 0.5 10E3/UL (ref 0–1.3)
MONOCYTES NFR BLD AUTO: 10 %
NEUTROPHILS # BLD AUTO: 2.1 10E3/UL (ref 1.6–8.3)
NEUTROPHILS NFR BLD AUTO: 41 %
NONHDLC SERPL-MCNC: 100 MG/DL
PLATELET # BLD AUTO: 173 10E3/UL (ref 150–450)
POTASSIUM SERPL-SCNC: 4.1 MMOL/L (ref 3.4–5.3)
PROT SERPL-MCNC: 7.1 G/DL (ref 6.4–8.3)
PSA SERPL DL<=0.01 NG/ML-MCNC: 1.04 NG/ML (ref 0–4.5)
RBC # BLD AUTO: 4.71 10E6/UL (ref 4.4–5.9)
SODIUM SERPL-SCNC: 143 MMOL/L (ref 135–145)
TRIGL SERPL-MCNC: 51 MG/DL
WBC # BLD AUTO: 5.2 10E3/UL (ref 4–11)

## 2024-08-06 PROCEDURE — 80053 COMPREHEN METABOLIC PANEL: CPT

## 2024-08-06 PROCEDURE — 85025 COMPLETE CBC W/AUTO DIFF WBC: CPT

## 2024-08-06 PROCEDURE — 80061 LIPID PANEL: CPT

## 2024-08-06 PROCEDURE — 36415 COLL VENOUS BLD VENIPUNCTURE: CPT

## 2024-08-06 PROCEDURE — G0103 PSA SCREENING: HCPCS

## 2024-08-30 ENCOUNTER — TELEPHONE (OUTPATIENT)
Dept: INTERNAL MEDICINE | Facility: CLINIC | Age: 59
End: 2024-08-30
Payer: COMMERCIAL

## 2024-08-30 NOTE — LETTER
August 30, 2024      Milad Brown  [id number] [facility] [id number]        To Whom It May Concern,       Milad Brown is a patient of the [facility] in [place], [place].  His most recent physical examination was performed on July 31, 2024.  He does take hydrochlorothiazide 25 mg by mouth once per day for management of his blood pressure.  His blood pressure is currently under good control.  His recent examination was unremarkable, and Milad was noted to be in good overall health.      Sincerely,        Santiago Workman MD

## 2024-08-30 NOTE — TELEPHONE ENCOUNTER
Forms/Letter Request    Type of form/letter: OTHER: [employer] license. Patient asks for Md letter to state patient is in good health and any health issues are under control.  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 LAST OFFICE VISIT 7- for physical        Do we have the form/letter: No    Who is the form from? NA    Where did/will the form come from? NA     When is form/letter needed by: 5 days lead time     How would you like the form/letter returned: emailed and BeCouply Vargas  [email or web address]      Patient Notified form requests are processed in 5-7 business days:Yes    Could we send this information to you in Docin or would you prefer to receive a phone call?:   Patient would like to be contacted via Docin

## 2024-09-03 NOTE — TELEPHONE ENCOUNTER
Left voicemail for patient to call clinic back.     Summer RN 9:05 AM September 3, 2024   [facility]

## 2024-09-06 NOTE — TELEPHONE ENCOUNTER
Left voicemail for patient to call clinic back.     Summer RN 8:15 AM September 6, 2024   [facility]

## 2024-10-17 ENCOUNTER — OFFICE VISIT (OUTPATIENT)
Dept: URGENT CARE | Facility: CLINIC | Age: 59
End: 2024-10-17
Payer: COMMERCIAL

## 2024-10-17 ENCOUNTER — TELEPHONE (OUTPATIENT)
Dept: INTERNAL MEDICINE | Facility: CLINIC | Age: 59
End: 2024-10-17
Payer: COMMERCIAL

## 2024-10-17 VITALS
DIASTOLIC BLOOD PRESSURE: 95 MMHG | RESPIRATION RATE: 16 BRPM | HEART RATE: 76 BPM | WEIGHT: 220 LBS | SYSTOLIC BLOOD PRESSURE: 167 MMHG | TEMPERATURE: 97 F | HEIGHT: 76 IN | BODY MASS INDEX: 26.79 KG/M2 | OXYGEN SATURATION: 99 %

## 2024-10-17 DIAGNOSIS — Z01.30 ENCOUNTER FOR BLOOD PRESSURE EXAMINATION: Primary | ICD-10-CM

## 2024-10-17 DIAGNOSIS — I10 ESSENTIAL HYPERTENSION: Primary | ICD-10-CM

## 2024-10-17 RX ORDER — AMLODIPINE BESYLATE 5 MG/1
5 TABLET ORAL DAILY
Qty: 30 TABLET | Refills: 2 | Status: SHIPPED | OUTPATIENT
Start: 2024-10-17

## 2024-10-17 RX ORDER — HYDROCHLOROTHIAZIDE 25 MG/1
25 TABLET ORAL
COMMUNITY

## 2024-10-17 NOTE — PATIENT INSTRUCTIONS
Please follow up with primary care for evaluation of blood pressure medication effectiveness as blood pressure is elevated at time of visit.

## 2024-10-17 NOTE — TELEPHONE ENCOUNTER
Patient called to request Dr. Workman to send a different medication for hypertension.     States that in the past 1.5 weeks, his systolic BP were ranging from 165-167 and diastolic of 90's-100's. No symptoms. Patient states that he's been taking hydrochlorothiazide (HYDRODIURIL) 25 MG tablet once a day but doesn't seem to be helping.     Patient is out of town.   Pended requested pharmacy: [address] [place] [phone]    Dr. Workman is out. Routing to covering providers.

## 2024-10-17 NOTE — PROGRESS NOTES
"Subjective:      Patient ID: Roger Rasmussen is a 59 y.o. male.    Vitals:  height is 6' 4" (1.93 m) and weight is 99.8 kg (220 lb). His oral temperature is 97.3 °F (36.3 °C). His blood pressure is 167/95 (abnormal) and his pulse is 76. His respiration is 16 and oxygen saturation is 99%.     Chief Complaint: Blood Pressure Check    Patient in need of a blood pressure reading for a new job.  The blood pressure machine at the office was not working and he was told to go and obtain a blood pressure from somewhere else.         Constitution: Negative for chills, fatigue and fever.   HENT: Negative.     Cardiovascular: Negative.    Respiratory: Negative.     Gastrointestinal: Negative.    Musculoskeletal: Negative.    Neurological: Negative.       Objective:     Physical Exam   Constitutional: He is oriented to person, place, and time. normal  HENT:   Head: Normocephalic and atraumatic.   Ears:   Right Ear: External ear normal.   Left Ear: External ear normal.   Nose: Nose normal.   Mouth/Throat: Mucous membranes are moist.   Eyes: Conjunctivae are normal.   Cardiovascular: Normal rate, regular rhythm and normal heart sounds.   Pulmonary/Chest: Effort normal. No respiratory distress. He has no wheezes. He has no rhonchi. He has no rales.   Abdominal: Normal appearance.   Musculoskeletal: Normal range of motion.         General: Normal range of motion.   Neurological: He is alert and oriented to person, place, and time. He displays no weakness.   Skin: Skin is warm and dry.   Psychiatric: His behavior is normal. Mood, judgment and thought content normal.   Nursing note and vitals reviewed.      Assessment:     1. Encounter for blood pressure examination        Plan:       Encounter for blood pressure examination      Discussed medication with patient who acknowledges understanding and is agreeable to POC. Follow up with primary care. Increase fluid intake. Red flags for ER discussed.                "

## 2024-10-17 NOTE — TELEPHONE ENCOUNTER
Called and spoke with patient to relay provider message. Patient verbalizes understanding of instructions and indicates no further questions at this time.    Thank you,  Bartolome Hagan, Triage RN Kemar Douglas  1:26 PM 10/17/2024

## 2024-10-18 ENCOUNTER — OFFICE VISIT (OUTPATIENT)
Dept: URGENT CARE | Facility: CLINIC | Age: 59
End: 2024-10-18
Payer: COMMERCIAL

## 2024-10-18 VITALS
HEART RATE: 75 BPM | DIASTOLIC BLOOD PRESSURE: 92 MMHG | TEMPERATURE: 98 F | RESPIRATION RATE: 14 BRPM | BODY MASS INDEX: 26.79 KG/M2 | SYSTOLIC BLOOD PRESSURE: 149 MMHG | OXYGEN SATURATION: 100 % | WEIGHT: 220 LBS | HEIGHT: 76 IN

## 2024-10-18 DIAGNOSIS — Z01.30 ENCOUNTER FOR BLOOD PRESSURE EXAMINATION: Primary | ICD-10-CM

## 2024-10-18 DIAGNOSIS — Z86.79 HISTORY OF HYPERTENSION: ICD-10-CM

## 2024-10-18 NOTE — PATIENT INSTRUCTIONS
Okay to return to work.  Continue blood pressure medications already prescribed.    Please schedule a close follow-up appointment with your primary care provider for close management of blood pressure and possible need for an additional blood pressure medication.        Return to clinic or seek medical evaluation for symptoms of severe headache, vision disturbances, chest pain, shortness of breath, dizziness, focal weakness, numbness tingling due to elevated blood pressure

## 2024-10-18 NOTE — PROGRESS NOTES
"Subjective:      Patient ID: Roger Rasmussen is a 59 y.o. male.    Vitals:  height is 6' 4" (1.93 m) and weight is 99.8 kg (220 lb). His oral temperature is 98.1 °F (36.7 °C). His blood pressure is 149/92 (abnormal) and his pulse is 75. His respiration is 14 and oxygen saturation is 100%.     Chief Complaint: Hypertension    Hypertension  This is a chronic problem. The problem has been gradually improving since onset. The problem is uncontrolled. Pertinent negatives include no blurred vision, chest pain, headaches or shortness of breath.       Constitution: Negative for generalized weakness.   Cardiovascular:  Negative for chest pain.   Eyes:  Negative for vision loss, double vision and blurred vision.   Respiratory:  Negative for shortness of breath.    Neurological:  Negative for dizziness and headaches.      Objective:     Physical Exam   Constitutional: He is oriented to person, place, and time.  Non-toxic appearance. He does not appear ill. No distress.   HENT:   Head: Normocephalic and atraumatic.   Nose: Nose normal.   Mouth/Throat: Mucous membranes are moist.   Eyes: Conjunctivae are normal.   Cardiovascular: Normal rate.   Pulmonary/Chest: Effort normal. No respiratory distress.   Abdominal: Normal appearance.   Neurological: no focal deficit. He is alert and oriented to person, place, and time.   Skin: Skin is warm, dry and not diaphoretic. Capillary refill takes 2 to 3 seconds.   Psychiatric: His behavior is normal. Mood normal.   Nursing note and vitals reviewed.      Assessment:     1. Encounter for blood pressure examination    2. History of hypertension        Plan:       Encounter for blood pressure examination    History of hypertension                    "

## 2024-12-03 ENCOUNTER — OCCUPATIONAL HEALTH (OUTPATIENT)
Dept: URGENT CARE | Facility: CLINIC | Age: 59
End: 2024-12-03

## 2024-12-03 DIAGNOSIS — Z02.83 ENCOUNTER FOR DRUG SCREENING: Primary | ICD-10-CM

## 2024-12-09 ENCOUNTER — PATIENT OUTREACH (OUTPATIENT)
Dept: CARE COORDINATION | Facility: CLINIC | Age: 59
End: 2024-12-09
Payer: COMMERCIAL

## 2025-01-16 DIAGNOSIS — I10 ESSENTIAL HYPERTENSION: ICD-10-CM

## 2025-01-16 RX ORDER — AMLODIPINE BESYLATE 5 MG/1
5 TABLET ORAL DAILY
Qty: 90 TABLET | Refills: 1 | Status: SHIPPED | OUTPATIENT
Start: 2025-01-16

## 2025-07-07 ENCOUNTER — PATIENT OUTREACH (OUTPATIENT)
Dept: CARE COORDINATION | Facility: CLINIC | Age: 60
End: 2025-07-07
Payer: COMMERCIAL